# Patient Record
Sex: MALE | Race: WHITE | Employment: FULL TIME | URBAN - METROPOLITAN AREA
[De-identification: names, ages, dates, MRNs, and addresses within clinical notes are randomized per-mention and may not be internally consistent; named-entity substitution may affect disease eponyms.]

---

## 2020-05-09 ENCOUNTER — APPOINTMENT (OUTPATIENT)
Dept: CT IMAGING | Age: 25
End: 2020-05-09
Payer: MEDICAID

## 2020-05-09 ENCOUNTER — HOSPITAL ENCOUNTER (EMERGENCY)
Age: 25
Discharge: HOME | End: 2020-05-09
Attending: EMERGENCY MEDICINE
Payer: MEDICAID

## 2020-05-09 VITALS
TEMPERATURE: 98.8 F | HEART RATE: 79 BPM | SYSTOLIC BLOOD PRESSURE: 130 MMHG | DIASTOLIC BLOOD PRESSURE: 70 MMHG | RESPIRATION RATE: 16 BRPM | OXYGEN SATURATION: 99 %

## 2020-05-09 LAB
A/G RATIO: 1 (ref 1.1–2.2)
ALBUMIN SERPL-MCNC: 4 G/DL (ref 3.4–5)
ALP BLD-CCNC: 124 U/L (ref 40–129)
ALT SERPL-CCNC: 13 U/L (ref 10–40)
ANION GAP SERPL CALCULATED.3IONS-SCNC: 6 MMOL/L (ref 3–16)
AST SERPL-CCNC: 23 U/L (ref 15–37)
BASOPHILS ABSOLUTE: 0 K/UL (ref 0–0.2)
BASOPHILS RELATIVE PERCENT: 0.4 %
BILIRUB SERPL-MCNC: 0.5 MG/DL (ref 0–1)
BUN BLDV-MCNC: 11 MG/DL (ref 7–20)
C-REACTIVE PROTEIN: 147.4 MG/L (ref 0–5.1)
CALCIUM SERPL-MCNC: 9.4 MG/DL (ref 8.3–10.6)
CHLORIDE BLD-SCNC: 101 MMOL/L (ref 99–110)
CO2: 28 MMOL/L (ref 21–32)
CREAT SERPL-MCNC: 1 MG/DL (ref 0.9–1.3)
EOSINOPHILS ABSOLUTE: 0.1 K/UL (ref 0–0.6)
EOSINOPHILS RELATIVE PERCENT: 0.9 %
GFR AFRICAN AMERICAN: >60
GFR NON-AFRICAN AMERICAN: >60
GLOBULIN: 4.1 G/DL
GLUCOSE BLD-MCNC: 91 MG/DL (ref 70–99)
HCT VFR BLD CALC: 40.9 % (ref 40.5–52.5)
HEMOGLOBIN: 13.7 G/DL (ref 13.5–17.5)
LACTIC ACID, SEPSIS: 0.7 MMOL/L (ref 0.4–1.9)
LYMPHOCYTES ABSOLUTE: 1.4 K/UL (ref 1–5.1)
LYMPHOCYTES RELATIVE PERCENT: 12 %
MCH RBC QN AUTO: 29.1 PG (ref 26–34)
MCHC RBC AUTO-ENTMCNC: 33.5 G/DL (ref 31–36)
MCV RBC AUTO: 86.8 FL (ref 80–100)
MONOCYTES ABSOLUTE: 0.6 K/UL (ref 0–1.3)
MONOCYTES RELATIVE PERCENT: 5.6 %
NEUTROPHILS ABSOLUTE: 9.4 K/UL (ref 1.7–7.7)
NEUTROPHILS RELATIVE PERCENT: 81.1 %
PDW BLD-RTO: 13.3 % (ref 12.4–15.4)
PLATELET # BLD: 283 K/UL (ref 135–450)
PMV BLD AUTO: 6.6 FL (ref 5–10.5)
POTASSIUM REFLEX MAGNESIUM: 4.8 MMOL/L (ref 3.5–5.1)
RBC # BLD: 4.71 M/UL (ref 4.2–5.9)
SEDIMENTATION RATE, ERYTHROCYTE: 52 MM/HR (ref 0–15)
SODIUM BLD-SCNC: 135 MMOL/L (ref 136–145)
TOTAL PROTEIN: 8.1 G/DL (ref 6.4–8.2)
WBC # BLD: 11.6 K/UL (ref 4–11)

## 2020-05-09 PROCEDURE — 36415 COLL VENOUS BLD VENIPUNCTURE: CPT

## 2020-05-09 PROCEDURE — 80053 COMPREHEN METABOLIC PANEL: CPT

## 2020-05-09 PROCEDURE — 85652 RBC SED RATE AUTOMATED: CPT

## 2020-05-09 PROCEDURE — 6360000004 HC RX CONTRAST MEDICATION: Performed by: PHYSICIAN ASSISTANT

## 2020-05-09 PROCEDURE — 96366 THER/PROPH/DIAG IV INF ADDON: CPT

## 2020-05-09 PROCEDURE — 6360000002 HC RX W HCPCS: Performed by: PHYSICIAN ASSISTANT

## 2020-05-09 PROCEDURE — 87040 BLOOD CULTURE FOR BACTERIA: CPT

## 2020-05-09 PROCEDURE — 96375 TX/PRO/DX INJ NEW DRUG ADDON: CPT

## 2020-05-09 PROCEDURE — 99283 EMERGENCY DEPT VISIT LOW MDM: CPT

## 2020-05-09 PROCEDURE — 83605 ASSAY OF LACTIC ACID: CPT

## 2020-05-09 PROCEDURE — 96365 THER/PROPH/DIAG IV INF INIT: CPT

## 2020-05-09 PROCEDURE — 70487 CT MAXILLOFACIAL W/DYE: CPT

## 2020-05-09 PROCEDURE — 85025 COMPLETE CBC W/AUTO DIFF WBC: CPT

## 2020-05-09 PROCEDURE — 86140 C-REACTIVE PROTEIN: CPT

## 2020-05-09 PROCEDURE — 2580000003 HC RX 258: Performed by: PHYSICIAN ASSISTANT

## 2020-05-09 RX ORDER — HYDROCODONE BITARTRATE AND ACETAMINOPHEN 5; 325 MG/1; MG/1
1 TABLET ORAL EVERY 6 HOURS PRN
Qty: 8 TABLET | Refills: 0 | Status: SHIPPED | OUTPATIENT
Start: 2020-05-09 | End: 2020-05-11

## 2020-05-09 RX ORDER — AMOXICILLIN AND CLAVULANATE POTASSIUM 875; 125 MG/1; MG/1
1 TABLET, FILM COATED ORAL 2 TIMES DAILY
Qty: 20 TABLET | Refills: 0 | Status: SHIPPED | OUTPATIENT
Start: 2020-05-09 | End: 2020-05-19

## 2020-05-09 RX ORDER — KETOROLAC TROMETHAMINE 30 MG/ML
15 INJECTION, SOLUTION INTRAMUSCULAR; INTRAVENOUS ONCE
Status: COMPLETED | OUTPATIENT
Start: 2020-05-09 | End: 2020-05-09

## 2020-05-09 RX ORDER — ONDANSETRON 2 MG/ML
4 INJECTION INTRAMUSCULAR; INTRAVENOUS ONCE
Status: COMPLETED | OUTPATIENT
Start: 2020-05-09 | End: 2020-05-09

## 2020-05-09 RX ADMIN — ONDANSETRON 4 MG: 2 INJECTION INTRAMUSCULAR; INTRAVENOUS at 11:03

## 2020-05-09 RX ADMIN — IOPAMIDOL 75 ML: 755 INJECTION, SOLUTION INTRAVENOUS at 11:44

## 2020-05-09 RX ADMIN — SODIUM CHLORIDE 3 G: 900 INJECTION INTRAVENOUS at 11:07

## 2020-05-09 RX ADMIN — KETOROLAC TROMETHAMINE 15 MG: 30 INJECTION, SOLUTION INTRAMUSCULAR at 11:03

## 2020-05-09 ASSESSMENT — ENCOUNTER SYMPTOMS
CHEST TIGHTNESS: 0
NAUSEA: 0
ABDOMINAL PAIN: 0
DIARRHEA: 0
FACIAL SWELLING: 1
VOMITING: 0
SHORTNESS OF BREATH: 0

## 2020-05-09 ASSESSMENT — PAIN SCALES - GENERAL
PAINLEVEL_OUTOF10: 7
PAINLEVEL_OUTOF10: 8

## 2020-05-09 NOTE — ED PROVIDER NOTES
I independently performed a history and physical on Jalil Lewis. All diagnostic, treatment, and disposition decisions were made by myself in conjunction with the advanced practice provider. Briefly, this is a 21 y.o. male here for facial abscess  Noted 2-3d ago  No fever chills, sweats. No n/v/d. Failed PCN/Clinda    On exam, Swollen right face; some abscess noted. No communication with extroral space. Screenings                   MDM  Dental abscess; needs OMFS consult. Will consult after CT. Patient Referrals: You will receive a telephone call from the [facility] oral maxillofacial surgery to arrange an appointment time on Monday at [facility] on the second floor. Do not eat or drink after midnight on Sunday night for possible surgery. [facility] Emergency Department  [address] [place] [address]  [phone]    If symptoms worsen      Discharge Medications:  Discharge Medication List as of 5/9/2020  2:44 PM      START taking these medications    Details   amoxicillin-clavulanate (AUGMENTIN) 875-125 MG per tablet Take 1 tablet by mouth 2 times daily for 10 days, Disp-20 tablet, R-0Print      HYDROcodone-acetaminophen (NORCO) 5-325 MG per tablet Take 1 tablet by mouth every 6 hours as needed for Pain for up to 2 days. , Disp-8 tablet, R-0Print             FINAL IMPRESSION  1. 33 mm right buccal odontogenic abscess from first molar        Blood pressure 130/70, pulse 79, temperature 98.8 °F (37.1 °C), temperature source Oral, resp. rate 16, SpO2 99 %. For further details of [facility] emergency department encounter, please see documentation by advanced practice provider, Maggie Botello.        Vic Jansen MD  05/10/20 0626

## 2020-05-09 NOTE — ED NOTES
Pt discharged home, discharge instructions reviewed, pt verbalized understanding. Pt ambulatory with a steady gait, advised to return with any concerns.       Niall Barrera RN  05/09/20 2797

## 2020-05-09 NOTE — ED PROVIDER NOTES
nursing note reviewed. Constitutional:       General: He is not in acute distress. Appearance: He is well-developed. He is not diaphoretic. HENT:      Head: Normocephalic and atraumatic. No raccoon eyes or Draper's sign. Jaw: Trismus and swelling present. No tenderness. Right Ear: Hearing and external ear normal.      Left Ear: Hearing and external ear normal.      Mouth/Throat:      Lips: Pink. Mouth: Mucous membranes are moist.      Comments: Intraoral examination is very difficult as the patient cannot fully open his mouth. He is tender over the bony ridge of his dentition as well as into the right upper maxilla. Swelling is as documented in the clinical image. Eyes:      General: No scleral icterus. Right eye: No discharge. Left eye: No discharge. Conjunctiva/sclera: Conjunctivae normal.   Neck:      Musculoskeletal: Normal range of motion. Vascular: No JVD. Cardiovascular:      Rate and Rhythm: Normal rate and regular rhythm. Heart sounds: No murmur. No friction rub. No gallop. Pulmonary:      Effort: Pulmonary effort is normal. No accessory muscle usage or respiratory distress. Breath sounds: Normal breath sounds. No wheezing, rhonchi or rales. Skin:     General: Skin is warm and dry. Neurological:      Mental Status: He is alert and oriented to person, place, and time. GCS: GCS eye subscore is 4. GCS verbal subscore is 5. GCS motor subscore is 6. Cranial Nerves: No cranial nerve deficit. Sensory: No sensory deficit. Coordination: Coordination normal.   Psychiatric:         Behavior: Behavior normal. Behavior is cooperative.        Clinical image:          DIAGNOSTIC RESULTS   LABS:    Labs Reviewed   CBC WITH AUTO DIFFERENTIAL - Abnormal; Notable for the following components:       Result Value    WBC 11.6 (*)     Neutrophils Absolute 9.4 (*)     All other components within normal limits    Narrative:     Performed at:  [facility] [address]   Phone [phone]   COMPREHENSIVE METABOLIC PANEL W/ REFLEX TO MG FOR LOW K - Abnormal; Notable for the following components:    Sodium 135 (*)     Albumin/Globulin Ratio 1.0 (*)     All other components within normal limits    Narrative:     Performed at:  [facility] [address]   Phone [phone]   SEDIMENTATION RATE - Abnormal; Notable for the following components:    Sed Rate 52 (*)     All other components within normal limits    Narrative:     Performed at:  [facility] [address]   Phone [phone]   CULTURE, BLOOD 1   CULTURE, BLOOD 2   LACTATE, SEPSIS    Narrative:     Performed at:  [facility] [address]   Phone [phone]   C-REACTIVE PROTEIN       All other labs were within normal range or not returned as of this dictation. EKG: All EKG's are interpreted by the Emergency Department Physician in the absence of a cardiologist.  Please see their note for interpretation of EKG. RADIOLOGY:   Non-plain film images such as CT, Ultrasound and MRI are read by the radiologist. Plain radiographic images are visualized and preliminarily interpreted by the ED Provider with the below findings:        Interpretation per the Radiologist below, if available at the time of this note:    CT MAXILLOFACIAL W CONTRAST   Preliminary Result   33 mm right buccal space odontogenic abscess.              PROCEDURES   Unless otherwise noted below, none     Procedures    CRITICAL CARE TIME   N/A    CONSULTS:  None      EMERGENCY DEPARTMENT COURSE and DIFFERENTIAL DIAGNOSIS/MDM:   Vitals:    Vitals:    05/09/20 1130 05/09/20 1200 05/09/20 1215 05/09/20 1230   BP: 128/83 137/75  133/79   Pulse:       Resp:       Temp:       TempSrc:       SpO2:

## 2020-05-10 ENCOUNTER — HOSPITAL ENCOUNTER (EMERGENCY)
Age: 25
Discharge: HOME | End: 2020-05-10
Attending: EMERGENCY MEDICINE
Payer: MEDICAID

## 2020-05-10 VITALS
RESPIRATION RATE: 20 BRPM | SYSTOLIC BLOOD PRESSURE: 138 MMHG | HEART RATE: 100 BPM | BODY MASS INDEX: 22.5 KG/M2 | DIASTOLIC BLOOD PRESSURE: 81 MMHG | WEIGHT: 127 LBS | TEMPERATURE: 99.1 F | OXYGEN SATURATION: 98 %

## 2020-05-10 LAB — SARS-COV-2, PCR: NOT DETECTED

## 2020-05-10 PROCEDURE — 6370000000 HC RX 637 (ALT 250 FOR IP): Performed by: EMERGENCY MEDICINE

## 2020-05-10 PROCEDURE — 99283 EMERGENCY DEPT VISIT LOW MDM: CPT

## 2020-05-10 PROCEDURE — U0003 INFECTIOUS AGENT DETECTION BY NUCLEIC ACID (DNA OR RNA); SEVERE ACUTE RESPIRATORY SYNDROME CORONAVIRUS 2 (SARS-COV-2) (CORONAVIRUS DISEASE [COVID-19]), AMPLIFIED PROBE TECHNIQUE, MAKING USE OF HIGH THROUGHPUT TECHNOLOGIES AS DESCRIBED BY CMS-2020-01-R: HCPCS

## 2020-05-10 RX ORDER — HYDROCODONE BITARTRATE AND ACETAMINOPHEN 5; 325 MG/1; MG/1
2 TABLET ORAL ONCE
Status: COMPLETED | OUTPATIENT
Start: 2020-05-10 | End: 2020-05-10

## 2020-05-10 RX ORDER — AMOXICILLIN AND CLAVULANATE POTASSIUM 875; 125 MG/1; MG/1
1 TABLET, FILM COATED ORAL ONCE
Status: COMPLETED | OUTPATIENT
Start: 2020-05-10 | End: 2020-05-10

## 2020-05-10 RX ADMIN — AMOXICILLIN AND CLAVULANATE POTASSIUM 1 TABLET: 875; 125 TABLET, FILM COATED ORAL at 05:19

## 2020-05-10 RX ADMIN — HYDROCODONE BITARTRATE AND ACETAMINOPHEN 2 TABLET: 5; 325 TABLET ORAL at 05:19

## 2020-05-10 ASSESSMENT — PAIN SCALES - GENERAL
PAINLEVEL_OUTOF10: 7
PAINLEVEL_OUTOF10: 7

## 2020-05-10 NOTE — ED PROVIDER NOTES
eMERGENCY dEPARTMENT eNCOUnter      CHIEF COMPLAINT    No chief complaint on file. HPI    Grace Sloan is a 21 y.o. male who presents wanting a test for COVID-19. Patient has a painful abscess for which he is on antibiotics and he is known to have the surgery but he was called and told to return to the emergency department to get a COVID-19 test.  He returned at 5 AM.  He has some facial swelling no difficulty breathing or swallowing he is afebrile. No shortness of breath or any other complaint    PAST MEDICAL HISTORY    Past Medical History:   Diagnosis Date    Asthma     HIV (human immunodeficiency virus infection) ([facility] [address]        SURGICAL HISTORY    No past surgical history on file. CURRENT MEDICATIONS    Current Outpatient Rx   Medication Sig Dispense Refill    amoxicillin-clavulanate (AUGMENTIN) 875-125 MG per tablet Take 1 tablet by mouth 2 times daily for 10 days 20 tablet 0    HYDROcodone-acetaminophen (NORCO) 5-325 MG per tablet Take 1 tablet by mouth every 6 hours as needed for Pain for up to 2 days. 8 tablet 0    STRIBILD [phone] MG tablet TAKE 1 TABLET BY MOUTH DAILY. 6       ALLERGIES    Allergies   Allergen Reactions    Singulair [Montelukast Sodium] Other (See Comments)     headache       FAMILY HISTORY    No family history on file.     SOCIAL HISTORY    Social History     Socioeconomic History    Marital status: Single     Spouse name: Not on file    Number of children: Not on file    Years of education: Not on file    Highest education level: Not on file   Occupational History    Not on file   Social Needs    Financial resource strain: Not on file    Food insecurity     Worry: Not on file     Inability: Not on file    Transportation needs     Medical: Not on file     Non-medical: Not on file   Tobacco Use    Smoking status: Current Every Day Smoker     Packs/day: 0.50     Types: Cigarettes   Substance and Sexual Activity    Alcohol use: Yes     Comment: once weekly    Drug use: No    Sexual activity: Not on file   Lifestyle    Physical activity     Days per week: Not on file     Minutes per session: Not on file    Stress: Not on file   Relationships    Social connections     Talks on phone: Not on file     Gets together: Not on file     Attends Jehovah's witness service: Not on file     Active member of club or organization: Not on file     Attends meetings of clubs or organizations: Not on file     Relationship status: Not on file    Intimate partner violence     Fear of current or ex partner: Not on file     Emotionally abused: Not on file     Physically abused: Not on file     Forced sexual activity: Not on file   Other Topics Concern    Not on file   Social History Narrative    Not on file       REVIEW OF SYSTEMS    Constitutional:  Denies fever, chills, weight loss or weakness   Eyes:  Denies photophobia or discharge   HENT:  Denies sore throat or ear pain, facial swelling on the right  Respiratory:  Denies cough or shortness of breath   Cardiovascular:  Denies chest pain, palpitations or swelling   GI:  Denies abdominal pain, nausea, vomiting, or diarrhea   Musculoskeletal:  Denies back pain   Skin:  Denies rash   Neurologic:  Denies headache, focal weakness or sensory changes   Endocrine:  Denies polyuria or polydypsia   Lymphatic:  Denies swollen glands   Psychiatric:  Denies depression, suicidal ideation or homicidal ideation   All systems negative except as marked. PHYSICAL EXAM    VITAL SIGNS: There were no vitals taken for this visit. Constitutional:  Well developed, Well nourished, No acute distress, Non-toxic appearance. HENT: Right-sided facial swelling atraumatic, Bilateral external ears normal, Oropharynx moist, No oral exudates, Nose normal. Neck- Normal range of motion, No tenderness, Supple, No stridor. Eyes:  PERRL, EOMI, Conjunctiva normal, No discharge.    Respiratory:  Normal breath sounds, No respiratory distress, No wheezing, No chest tenderness. Cardiovascular:  Normal heart rate, Normal rhythm, No murmurs, No rubs, No gallops. GI:  Bowel sounds normal, Soft, No tenderness, No masses, No pulsatile masses. Musculoskeletal:  Intact distal pulses, No edema, No tenderness, No cyanosis, No clubbing. Good range of motion in all major joints. No tenderness to palpation or major deformities noted. Back- No tenderness. Integument:  Warm, Dry, No erythema, No rash. Lymphatic:  No lymphadenopathy noted. Neurologic:  Alert & oriented x 3, Normal motor function, Normal sensory function, No focal deficits noted. Psychiatric:  Affect normal, Judgment normal, Mood normal.     EKG        RADIOLOGY        PROCEDURES        ED COURSE & MEDICAL DECISION MAKING    Pertinent Labs & Imaging studies reviewed. (See chart for details)  We did a COVID-19 swab for his preoperative evaluation. He is asymptomatic and complains of swelling. He did not voice any other concerns    FINAL IMPRESSION    1.  Facial abscess             Justine Knott MD  05/10/20 1843

## 2020-05-11 ENCOUNTER — CARE COORDINATION (OUTPATIENT)
Dept: CARE COORDINATION | Age: 25
End: 2020-05-11

## 2020-05-11 NOTE — CARE COORDINATION
Patient contacted regarding Saray Childers. Care Transition Nurse/ Ambulatory Care Manager contacted the patient by telephone to perform post discharge assessment. Verified name and  with patient as identifiers. Provided introduction to self, and explanation of the CTN/ACM role, and reason for call due to risk factors for infection and/or exposure to COVID-19. Symptoms reviewed with patient who verbalized the following symptoms: no new symptoms and no worsening symptoms. Due to no new or worsening symptoms encounter was not routed to provider for escalation. Patient has following risk factors of: asthma and immunocompromised. CTN/ACM reviewed discharge instructions, medical action plan and red flags such as increased shortness of breath, increasing fever and signs of decompensation with patient who verbalized understanding. Discussed exposure protocols and quarantine with CDC Guidelines What to do if you are sick with coronavirus disease .  Patient was given an opportunity for questions and concerns. The patient agrees to contact the [facility] exposure line [phone]  and PCP office for questions related to their healthcare. CTN/ACM provided contact information for future needs. Reviewed and educated patient on any new and changed medications related to discharge diagnosis     Patient/family/caregiver given information for GetWell Loop and agrees to enroll yes  Patient's preferred e-mail: [email or web address]   Patient's preferred phone number: [phone]   Based on Loop alert triggers, patient will be contacted by nurse care manager for worsening symptoms. Pt will be further monitored by COVID Loop Team based on severity of symptoms and risk factors. Princess Jacobs plans to contact  today to schedule an appt.  has not contacted today yet. He is taking his antibiotic and his pain is controlled. Mima Reynaldo was made aware that his COVID test was negative.   NO other symptoms at this

## 2020-05-13 LAB
BLOOD CULTURE, ROUTINE: NORMAL
CULTURE, BLOOD 2: NORMAL

## 2020-09-27 ENCOUNTER — HOSPITAL ENCOUNTER (INPATIENT)
Age: 25
LOS: 4 days | Discharge: HOME | DRG: 254 | End: 2020-10-01
Attending: EMERGENCY MEDICINE | Admitting: HOSPITALIST
Payer: MEDICAID

## 2020-09-27 ENCOUNTER — APPOINTMENT (OUTPATIENT)
Dept: CT IMAGING | Age: 25
DRG: 254 | End: 2020-09-27
Payer: MEDICAID

## 2020-09-27 PROBLEM — K92.2 GI BLEED: Status: ACTIVE | Noted: 2020-09-27

## 2020-09-27 LAB
A/G RATIO: 1.2 (ref 1.1–2.2)
ALBUMIN SERPL-MCNC: 3.7 G/DL (ref 3.4–5)
ALP BLD-CCNC: 74 U/L (ref 40–129)
ALT SERPL-CCNC: 14 U/L (ref 10–40)
ANION GAP SERPL CALCULATED.3IONS-SCNC: 7 MMOL/L (ref 3–16)
AST SERPL-CCNC: 19 U/L (ref 15–37)
BASOPHILS ABSOLUTE: 0.1 K/UL (ref 0–0.2)
BASOPHILS RELATIVE PERCENT: 1.1 %
BILIRUB SERPL-MCNC: 0.4 MG/DL (ref 0–1)
BUN BLDV-MCNC: 13 MG/DL (ref 7–20)
CALCIUM SERPL-MCNC: 8.6 MG/DL (ref 8.3–10.6)
CHLORIDE BLD-SCNC: 103 MMOL/L (ref 99–110)
CO2: 25 MMOL/L (ref 21–32)
CREAT SERPL-MCNC: 1.1 MG/DL (ref 0.9–1.3)
EOSINOPHILS ABSOLUTE: 0.1 K/UL (ref 0–0.6)
EOSINOPHILS RELATIVE PERCENT: 1.3 %
GFR AFRICAN AMERICAN: >60
GFR NON-AFRICAN AMERICAN: >60
GLOBULIN: 3 G/DL
GLUCOSE BLD-MCNC: 104 MG/DL (ref 70–99)
HCT VFR BLD CALC: 23.6 % (ref 40.5–52.5)
HEMOGLOBIN: 8.1 G/DL (ref 13.5–17.5)
LACTIC ACID, SEPSIS: 0.5 MMOL/L (ref 0.4–1.9)
LIPASE: 14 U/L (ref 13–60)
LYMPHOCYTES ABSOLUTE: 1.8 K/UL (ref 1–5.1)
LYMPHOCYTES RELATIVE PERCENT: 21.9 %
MCH RBC QN AUTO: 29.5 PG (ref 26–34)
MCHC RBC AUTO-ENTMCNC: 34.5 G/DL (ref 31–36)
MCV RBC AUTO: 85.7 FL (ref 80–100)
MONOCYTES ABSOLUTE: 0.5 K/UL (ref 0–1.3)
MONOCYTES RELATIVE PERCENT: 6 %
NEUTROPHILS ABSOLUTE: 5.6 K/UL (ref 1.7–7.7)
NEUTROPHILS RELATIVE PERCENT: 69.7 %
PDW BLD-RTO: 13.8 % (ref 12.4–15.4)
PLATELET # BLD: 320 K/UL (ref 135–450)
PMV BLD AUTO: 6.4 FL (ref 5–10.5)
POTASSIUM SERPL-SCNC: 3.9 MMOL/L (ref 3.5–5.1)
RBC # BLD: 2.76 M/UL (ref 4.2–5.9)
SODIUM BLD-SCNC: 135 MMOL/L (ref 136–145)
TOTAL PROTEIN: 6.7 G/DL (ref 6.4–8.2)
WBC # BLD: 8.1 K/UL (ref 4–11)

## 2020-09-27 PROCEDURE — 6360000002 HC RX W HCPCS: Performed by: NURSE PRACTITIONER

## 2020-09-27 PROCEDURE — 96375 TX/PRO/DX INJ NEW DRUG ADDON: CPT

## 2020-09-27 PROCEDURE — 74177 CT ABD & PELVIS W/CONTRAST: CPT

## 2020-09-27 PROCEDURE — 85025 COMPLETE CBC W/AUTO DIFF WBC: CPT

## 2020-09-27 PROCEDURE — 83690 ASSAY OF LIPASE: CPT

## 2020-09-27 PROCEDURE — 2580000003 HC RX 258: Performed by: NURSE PRACTITIONER

## 2020-09-27 PROCEDURE — 83605 ASSAY OF LACTIC ACID: CPT

## 2020-09-27 PROCEDURE — 99284 EMERGENCY DEPT VISIT MOD MDM: CPT

## 2020-09-27 PROCEDURE — 80053 COMPREHEN METABOLIC PANEL: CPT

## 2020-09-27 PROCEDURE — 2580000003 HC RX 258: Performed by: HOSPITALIST

## 2020-09-27 PROCEDURE — 96374 THER/PROPH/DIAG INJ IV PUSH: CPT

## 2020-09-27 PROCEDURE — 2060000000 HC ICU INTERMEDIATE R&B

## 2020-09-27 PROCEDURE — 6360000004 HC RX CONTRAST MEDICATION: Performed by: NURSE PRACTITIONER

## 2020-09-27 RX ORDER — MORPHINE SULFATE 4 MG/ML
4 INJECTION, SOLUTION INTRAMUSCULAR; INTRAVENOUS ONCE
Status: COMPLETED | OUTPATIENT
Start: 2020-09-27 | End: 2020-09-27

## 2020-09-27 RX ORDER — 0.9 % SODIUM CHLORIDE 0.9 %
1000 INTRAVENOUS SOLUTION INTRAVENOUS ONCE
Status: COMPLETED | OUTPATIENT
Start: 2020-09-27 | End: 2020-09-28

## 2020-09-27 RX ORDER — PEG-3350, SODIUM SULFATE, SODIUM CHLORIDE, POTASSIUM CHLORIDE, SODIUM ASCORBATE AND ASCORBIC ACID 7.5-2.691G
100 KIT ORAL ONCE
Status: COMPLETED | OUTPATIENT
Start: 2020-09-27 | End: 2020-09-28

## 2020-09-27 RX ORDER — 0.9 % SODIUM CHLORIDE 0.9 %
1000 INTRAVENOUS SOLUTION INTRAVENOUS ONCE
Status: COMPLETED | OUTPATIENT
Start: 2020-09-27 | End: 2020-09-27

## 2020-09-27 RX ORDER — CITALOPRAM 20 MG/1
20 TABLET ORAL DAILY
Status: ON HOLD | COMMUNITY
End: 2020-12-31

## 2020-09-27 RX ORDER — ONDANSETRON 2 MG/ML
4 INJECTION INTRAMUSCULAR; INTRAVENOUS EVERY 30 MIN PRN
Status: DISCONTINUED | OUTPATIENT
Start: 2020-09-27 | End: 2020-09-28 | Stop reason: SDUPTHER

## 2020-09-27 RX ADMIN — IOPAMIDOL 75 ML: 755 INJECTION, SOLUTION INTRAVENOUS at 21:27

## 2020-09-27 RX ADMIN — MORPHINE SULFATE 4 MG: 4 INJECTION, SOLUTION INTRAMUSCULAR; INTRAVENOUS at 20:32

## 2020-09-27 RX ADMIN — SODIUM CHLORIDE 1000 ML: 9 INJECTION, SOLUTION INTRAVENOUS at 20:30

## 2020-09-27 RX ADMIN — SODIUM CHLORIDE 1000 ML: 9 INJECTION, SOLUTION INTRAVENOUS at 23:55

## 2020-09-27 RX ADMIN — ONDANSETRON 4 MG: 2 INJECTION INTRAMUSCULAR; INTRAVENOUS at 20:30

## 2020-09-27 ASSESSMENT — ENCOUNTER SYMPTOMS
VOMITING: 0
NAUSEA: 0
DIARRHEA: 0
SHORTNESS OF BREATH: 0
ABDOMINAL PAIN: 1
ANAL BLEEDING: 1
CHEST TIGHTNESS: 0

## 2020-09-27 ASSESSMENT — PAIN SCALES - GENERAL
PAINLEVEL_OUTOF10: 10
PAINLEVEL_OUTOF10: 10

## 2020-09-27 NOTE — ED NOTES
Bed: 21  Expected date:   Expected time:   Means of arrival: Walk In  Comments:     [facility]  09/27/20 1910

## 2020-09-28 ENCOUNTER — ANESTHESIA EVENT (OUTPATIENT)
Dept: ENDOSCOPY | Age: 25
DRG: 254 | End: 2020-09-28
Payer: MEDICAID

## 2020-09-28 ENCOUNTER — ANESTHESIA (OUTPATIENT)
Dept: ENDOSCOPY | Age: 25
DRG: 254 | End: 2020-09-28
Payer: MEDICAID

## 2020-09-28 VITALS
DIASTOLIC BLOOD PRESSURE: 73 MMHG | RESPIRATION RATE: 12 BRPM | OXYGEN SATURATION: 100 % | SYSTOLIC BLOOD PRESSURE: 118 MMHG

## 2020-09-28 LAB
A/G RATIO: 1.5 (ref 1.1–2.2)
ABO/RH: NORMAL
ALBUMIN SERPL-MCNC: 3.4 G/DL (ref 3.4–5)
ALP BLD-CCNC: 71 U/L (ref 40–129)
ALT SERPL-CCNC: 13 U/L (ref 10–40)
ANION GAP SERPL CALCULATED.3IONS-SCNC: 6 MMOL/L (ref 3–16)
ANTIBODY SCREEN: NORMAL
AST SERPL-CCNC: 20 U/L (ref 15–37)
BASOPHILS ABSOLUTE: 0 K/UL (ref 0–0.2)
BASOPHILS RELATIVE PERCENT: 0.5 %
BILIRUB SERPL-MCNC: 0.6 MG/DL (ref 0–1)
BLOOD BANK DISPENSE STATUS: NORMAL
BLOOD BANK DISPENSE STATUS: NORMAL
BLOOD BANK PRODUCT CODE: NORMAL
BLOOD BANK PRODUCT CODE: NORMAL
BPU ID: NORMAL
BPU ID: NORMAL
BUN BLDV-MCNC: 13 MG/DL (ref 7–20)
CALCIUM SERPL-MCNC: 7.8 MG/DL (ref 8.3–10.6)
CHLORIDE BLD-SCNC: 109 MMOL/L (ref 99–110)
CO2: 26 MMOL/L (ref 21–32)
CREAT SERPL-MCNC: 1 MG/DL (ref 0.9–1.3)
DESCRIPTION BLOOD BANK: NORMAL
DESCRIPTION BLOOD BANK: NORMAL
EOSINOPHILS ABSOLUTE: 0.1 K/UL (ref 0–0.6)
EOSINOPHILS RELATIVE PERCENT: 1.8 %
GFR AFRICAN AMERICAN: >60
GFR NON-AFRICAN AMERICAN: >60
GLOBULIN: 2.3 G/DL
GLUCOSE BLD-MCNC: 81 MG/DL (ref 70–99)
HCT VFR BLD CALC: 18.9 % (ref 40.5–52.5)
HCT VFR BLD CALC: 26.7 % (ref 40.5–52.5)
HCT VFR BLD CALC: 27.6 % (ref 40.5–52.5)
HCT VFR BLD CALC: 28.9 % (ref 40.5–52.5)
HEMOGLOBIN: 6.8 G/DL (ref 13.5–17.5)
HEMOGLOBIN: 9 G/DL (ref 13.5–17.5)
HEMOGLOBIN: 9.5 G/DL (ref 13.5–17.5)
HEMOGLOBIN: 9.9 G/DL (ref 13.5–17.5)
LYMPHOCYTES ABSOLUTE: 1.5 K/UL (ref 1–5.1)
LYMPHOCYTES RELATIVE PERCENT: 24.2 %
MCH RBC QN AUTO: 28.9 PG (ref 26–34)
MCHC RBC AUTO-ENTMCNC: 33.9 G/DL (ref 31–36)
MCV RBC AUTO: 85.5 FL (ref 80–100)
MONOCYTES ABSOLUTE: 0.4 K/UL (ref 0–1.3)
MONOCYTES RELATIVE PERCENT: 6.4 %
NEUTROPHILS ABSOLUTE: 4.1 K/UL (ref 1.7–7.7)
NEUTROPHILS RELATIVE PERCENT: 67.1 %
PDW BLD-RTO: 14.9 % (ref 12.4–15.4)
PLATELET # BLD: 226 K/UL (ref 135–450)
PMV BLD AUTO: 6.2 FL (ref 5–10.5)
POTASSIUM REFLEX MAGNESIUM: 4 MMOL/L (ref 3.5–5.1)
RBC # BLD: 3.13 M/UL (ref 4.2–5.9)
SODIUM BLD-SCNC: 141 MMOL/L (ref 136–145)
TOTAL PROTEIN: 5.7 G/DL (ref 6.4–8.2)
WBC # BLD: 6.1 K/UL (ref 4–11)
WHITE BLOOD CELLS (WBC), STOOL: ABNORMAL

## 2020-09-28 PROCEDURE — 2580000003 HC RX 258: Performed by: NURSE ANESTHETIST, CERTIFIED REGISTERED

## 2020-09-28 PROCEDURE — 80053 COMPREHEN METABOLIC PANEL: CPT

## 2020-09-28 PROCEDURE — 86900 BLOOD TYPING SEROLOGIC ABO: CPT

## 2020-09-28 PROCEDURE — 86901 BLOOD TYPING SEROLOGIC RH(D): CPT

## 2020-09-28 PROCEDURE — 6360000002 HC RX W HCPCS: Performed by: NURSE ANESTHETIST, CERTIFIED REGISTERED

## 2020-09-28 PROCEDURE — 6370000000 HC RX 637 (ALT 250 FOR IP): Performed by: NURSE PRACTITIONER

## 2020-09-28 PROCEDURE — 2709999900 HC NON-CHARGEABLE SUPPLY: Performed by: INTERNAL MEDICINE

## 2020-09-28 PROCEDURE — 2500000003 HC RX 250 WO HCPCS: Performed by: NURSE ANESTHETIST, CERTIFIED REGISTERED

## 2020-09-28 PROCEDURE — 88342 IMHCHEM/IMCYTCHM 1ST ANTB: CPT

## 2020-09-28 PROCEDURE — 2580000003 HC RX 258: Performed by: ANESTHESIOLOGY

## 2020-09-28 PROCEDURE — 87506 IADNA-DNA/RNA PROBE TQ 6-11: CPT

## 2020-09-28 PROCEDURE — 3700000000 HC ANESTHESIA ATTENDED CARE: Performed by: INTERNAL MEDICINE

## 2020-09-28 PROCEDURE — 87177 OVA AND PARASITES SMEARS: CPT

## 2020-09-28 PROCEDURE — 2060000000 HC ICU INTERMEDIATE R&B

## 2020-09-28 PROCEDURE — 88305 TISSUE EXAM BY PATHOLOGIST: CPT

## 2020-09-28 PROCEDURE — 0DBP8ZX EXCISION OF RECTUM, VIA NATURAL OR ARTIFICIAL OPENING ENDOSCOPIC, DIAGNOSTIC: ICD-10-PCS | Performed by: INTERNAL MEDICINE

## 2020-09-28 PROCEDURE — 3609008300 HC SIGMOIDOSCOPY W/BIOPSY SINGLE/MULTIPLE: Performed by: INTERNAL MEDICINE

## 2020-09-28 PROCEDURE — 7100000000 HC PACU RECOVERY - FIRST 15 MIN: Performed by: INTERNAL MEDICINE

## 2020-09-28 PROCEDURE — 88341 IMHCHEM/IMCYTCHM EA ADD ANTB: CPT

## 2020-09-28 PROCEDURE — 85025 COMPLETE CBC W/AUTO DIFF WBC: CPT

## 2020-09-28 PROCEDURE — 0DBQ8ZX EXCISION OF ANUS, VIA NATURAL OR ARTIFICIAL OPENING ENDOSCOPIC, DIAGNOSTIC: ICD-10-PCS | Performed by: INTERNAL MEDICINE

## 2020-09-28 PROCEDURE — 85018 HEMOGLOBIN: CPT

## 2020-09-28 PROCEDURE — 86850 RBC ANTIBODY SCREEN: CPT

## 2020-09-28 PROCEDURE — 94761 N-INVAS EAR/PLS OXIMETRY MLT: CPT

## 2020-09-28 PROCEDURE — 7100000001 HC PACU RECOVERY - ADDTL 15 MIN: Performed by: INTERNAL MEDICINE

## 2020-09-28 PROCEDURE — 85014 HEMATOCRIT: CPT

## 2020-09-28 PROCEDURE — 87425 ROTAVIRUS AG IA: CPT

## 2020-09-28 PROCEDURE — P9016 RBC LEUKOCYTES REDUCED: HCPCS

## 2020-09-28 PROCEDURE — 2580000003 HC RX 258: Performed by: HOSPITALIST

## 2020-09-28 PROCEDURE — 83630 LACTOFERRIN FECAL (QUAL): CPT

## 2020-09-28 PROCEDURE — 36415 COLL VENOUS BLD VENIPUNCTURE: CPT

## 2020-09-28 PROCEDURE — 6360000002 HC RX W HCPCS: Performed by: HOSPITALIST

## 2020-09-28 PROCEDURE — 87209 SMEAR COMPLEX STAIN: CPT

## 2020-09-28 PROCEDURE — 86923 COMPATIBILITY TEST ELECTRIC: CPT

## 2020-09-28 PROCEDURE — 36430 TRANSFUSION BLD/BLD COMPNT: CPT

## 2020-09-28 PROCEDURE — 3700000001 HC ADD 15 MINUTES (ANESTHESIA): Performed by: INTERNAL MEDICINE

## 2020-09-28 RX ORDER — SODIUM CHLORIDE 9 MG/ML
INJECTION, SOLUTION INTRAVENOUS CONTINUOUS
Status: DISCONTINUED | OUTPATIENT
Start: 2020-09-28 | End: 2020-10-01

## 2020-09-28 RX ORDER — SODIUM CHLORIDE 0.9 % (FLUSH) 0.9 %
10 SYRINGE (ML) INJECTION EVERY 12 HOURS SCHEDULED
Status: DISCONTINUED | OUTPATIENT
Start: 2020-09-28 | End: 2020-10-01 | Stop reason: HOSPADM

## 2020-09-28 RX ORDER — LIDOCAINE HYDROCHLORIDE 20 MG/ML
INJECTION, SOLUTION INFILTRATION; PERINEURAL PRN
Status: DISCONTINUED | OUTPATIENT
Start: 2020-09-28 | End: 2020-09-28 | Stop reason: SDUPTHER

## 2020-09-28 RX ORDER — SODIUM CHLORIDE 0.9 % (FLUSH) 0.9 %
10 SYRINGE (ML) INJECTION PRN
Status: DISCONTINUED | OUTPATIENT
Start: 2020-09-28 | End: 2020-10-01 | Stop reason: HOSPADM

## 2020-09-28 RX ORDER — 0.9 % SODIUM CHLORIDE 0.9 %
20 INTRAVENOUS SOLUTION INTRAVENOUS ONCE
Status: COMPLETED | OUTPATIENT
Start: 2020-09-28 | End: 2020-09-28

## 2020-09-28 RX ORDER — POTASSIUM CHLORIDE 7.45 MG/ML
10 INJECTION INTRAVENOUS PRN
Status: DISCONTINUED | OUTPATIENT
Start: 2020-09-28 | End: 2020-10-01 | Stop reason: HOSPADM

## 2020-09-28 RX ORDER — SODIUM CHLORIDE 9 MG/ML
INJECTION, SOLUTION INTRAVENOUS CONTINUOUS
Status: DISCONTINUED | OUTPATIENT
Start: 2020-09-28 | End: 2020-09-29

## 2020-09-28 RX ORDER — SODIUM CHLORIDE 9 MG/ML
INJECTION, SOLUTION INTRAVENOUS CONTINUOUS PRN
Status: DISCONTINUED | OUTPATIENT
Start: 2020-09-28 | End: 2020-09-28 | Stop reason: SDUPTHER

## 2020-09-28 RX ORDER — ONDANSETRON 2 MG/ML
4 INJECTION INTRAMUSCULAR; INTRAVENOUS EVERY 6 HOURS PRN
Status: DISCONTINUED | OUTPATIENT
Start: 2020-09-28 | End: 2020-10-01 | Stop reason: HOSPADM

## 2020-09-28 RX ORDER — ACETAMINOPHEN 325 MG/1
650 TABLET ORAL EVERY 6 HOURS PRN
Status: DISCONTINUED | OUTPATIENT
Start: 2020-09-28 | End: 2020-10-01 | Stop reason: HOSPADM

## 2020-09-28 RX ORDER — 0.9 % SODIUM CHLORIDE 0.9 %
1000 INTRAVENOUS SOLUTION INTRAVENOUS ONCE
Status: COMPLETED | OUTPATIENT
Start: 2020-09-28 | End: 2020-09-28

## 2020-09-28 RX ORDER — POLYETHYLENE GLYCOL 3350 17 G/17G
17 POWDER, FOR SOLUTION ORAL DAILY PRN
Status: DISCONTINUED | OUTPATIENT
Start: 2020-09-28 | End: 2020-10-01 | Stop reason: HOSPADM

## 2020-09-28 RX ORDER — MORPHINE SULFATE 2 MG/ML
2 INJECTION, SOLUTION INTRAMUSCULAR; INTRAVENOUS EVERY 4 HOURS PRN
Status: DISCONTINUED | OUTPATIENT
Start: 2020-09-28 | End: 2020-10-01 | Stop reason: HOSPADM

## 2020-09-28 RX ORDER — PROPOFOL 10 MG/ML
INJECTION, EMULSION INTRAVENOUS CONTINUOUS PRN
Status: DISCONTINUED | OUTPATIENT
Start: 2020-09-28 | End: 2020-09-28 | Stop reason: SDUPTHER

## 2020-09-28 RX ORDER — PROPOFOL 10 MG/ML
INJECTION, EMULSION INTRAVENOUS PRN
Status: DISCONTINUED | OUTPATIENT
Start: 2020-09-28 | End: 2020-09-28 | Stop reason: SDUPTHER

## 2020-09-28 RX ORDER — ACETAMINOPHEN 650 MG/1
650 SUPPOSITORY RECTAL EVERY 6 HOURS PRN
Status: DISCONTINUED | OUTPATIENT
Start: 2020-09-28 | End: 2020-10-01 | Stop reason: HOSPADM

## 2020-09-28 RX ORDER — PROMETHAZINE HYDROCHLORIDE 25 MG/1
12.5 TABLET ORAL EVERY 6 HOURS PRN
Status: DISCONTINUED | OUTPATIENT
Start: 2020-09-28 | End: 2020-10-01 | Stop reason: HOSPADM

## 2020-09-28 RX ORDER — POTASSIUM CHLORIDE 20 MEQ/1
40 TABLET, EXTENDED RELEASE ORAL PRN
Status: DISCONTINUED | OUTPATIENT
Start: 2020-09-28 | End: 2020-10-01 | Stop reason: HOSPADM

## 2020-09-28 RX ORDER — MAGNESIUM SULFATE IN WATER 40 MG/ML
2 INJECTION, SOLUTION INTRAVENOUS PRN
Status: DISCONTINUED | OUTPATIENT
Start: 2020-09-28 | End: 2020-10-01 | Stop reason: HOSPADM

## 2020-09-28 RX ADMIN — PROPOFOL 80 MG: 10 INJECTION, EMULSION INTRAVENOUS at 13:20

## 2020-09-28 RX ADMIN — MORPHINE SULFATE 2 MG: 2 INJECTION, SOLUTION INTRAMUSCULAR; INTRAVENOUS at 08:31

## 2020-09-28 RX ADMIN — LIDOCAINE HYDROCHLORIDE 80 MG: 20 INJECTION, SOLUTION INFILTRATION; PERINEURAL at 13:20

## 2020-09-28 RX ADMIN — SODIUM CHLORIDE: 9 INJECTION, SOLUTION INTRAVENOUS at 13:15

## 2020-09-28 RX ADMIN — POLYETHYLENE GLYCOL 3350, SODIUM SULFATE, SODIUM CHLORIDE, POTASSIUM CHLORIDE, ASCORBIC ACID, SODIUM ASCORBATE 100 G: KIT at 01:44

## 2020-09-28 RX ADMIN — POLYETHYLENE GLYCOL 3350, SODIUM SULFATE, SODIUM CHLORIDE, POTASSIUM CHLORIDE, ASCORBIC ACID, SODIUM ASCORBATE 100 G: KIT at 06:36

## 2020-09-28 RX ADMIN — PROPOFOL 140 MCG/KG/MIN: 10 INJECTION, EMULSION INTRAVENOUS at 13:20

## 2020-09-28 RX ADMIN — PROPOFOL 60 MG: 10 INJECTION, EMULSION INTRAVENOUS at 13:24

## 2020-09-28 RX ADMIN — SODIUM CHLORIDE 20 ML: 9 INJECTION, SOLUTION INTRAVENOUS at 06:33

## 2020-09-28 RX ADMIN — SODIUM CHLORIDE: 9 INJECTION, SOLUTION INTRAVENOUS at 01:53

## 2020-09-28 RX ADMIN — SODIUM CHLORIDE: 9 INJECTION, SOLUTION INTRAVENOUS at 21:46

## 2020-09-28 RX ADMIN — SODIUM CHLORIDE 1000 ML: 9 INJECTION, SOLUTION INTRAVENOUS at 08:23

## 2020-09-28 RX ADMIN — SODIUM CHLORIDE: 9 INJECTION, SOLUTION INTRAVENOUS at 12:21

## 2020-09-28 ASSESSMENT — PAIN SCALES - GENERAL
PAINLEVEL_OUTOF10: 0
PAINLEVEL_OUTOF10: 9
PAINLEVEL_OUTOF10: 0
PAINLEVEL_OUTOF10: 3
PAINLEVEL_OUTOF10: 9
PAINLEVEL_OUTOF10: 0

## 2020-09-28 ASSESSMENT — PAIN DESCRIPTION - PROGRESSION
CLINICAL_PROGRESSION: NOT CHANGED

## 2020-09-28 ASSESSMENT — PAIN - FUNCTIONAL ASSESSMENT
PAIN_FUNCTIONAL_ASSESSMENT: 0-10
PAIN_FUNCTIONAL_ASSESSMENT: PREVENTS OR INTERFERES SOME ACTIVE ACTIVITIES AND ADLS

## 2020-09-28 ASSESSMENT — PAIN DESCRIPTION - DESCRIPTORS: DESCRIPTORS: ACHING;BURNING;CONSTANT

## 2020-09-28 ASSESSMENT — PAIN DESCRIPTION - PAIN TYPE: TYPE: ACUTE PAIN

## 2020-09-28 ASSESSMENT — PAIN DESCRIPTION - FREQUENCY: FREQUENCY: CONTINUOUS

## 2020-09-28 ASSESSMENT — PAIN DESCRIPTION - ONSET: ONSET: ON-GOING

## 2020-09-28 ASSESSMENT — PAIN DESCRIPTION - LOCATION: LOCATION: ABDOMEN

## 2020-09-28 ASSESSMENT — PAIN DESCRIPTION - ORIENTATION: ORIENTATION: LOWER

## 2020-09-28 NOTE — PROGRESS NOTES
Dr. Anny English talked to and pt will be starting blood transfusion within the next half hour. Second  peripheral line will be started with blood administration for bolus of NS. Pt alert and oriented just lethargic and states he \"feels weak\". BP and HR also communicated with . Patient will be monitored closely for the next hour.

## 2020-09-28 NOTE — ED PROVIDER NOTES
OF SYSTEMS    (2-9 systems for level 4, 10 or more for level 5)     Review of Systems   Constitutional: Negative for activity change, chills and fever. Respiratory: Negative for chest tightness and shortness of breath. Cardiovascular: Negative for chest pain. Gastrointestinal: Positive for abdominal pain and anal bleeding. Negative for diarrhea, nausea and vomiting. Genitourinary: Negative for dysuria. All other systems reviewed and are negative. Positives and Pertinent negatives as per HPI. Except as noted above in the ROS, all other systems were reviewed and negative. PAST MEDICAL HISTORY     Past Medical History:   Diagnosis Date    Asthma     HIV (human immunodeficiency virus infection) ([facility] [address]          SURGICAL HISTORY   History reviewed. No pertinent surgical history. CURRENTMEDICATIONS       Previous Medications    BICTEGRAVIR-EMTRICITAB-TENOFOV (BIKTARVY PO)    Take by mouth    CITALOPRAM (CELEXA) 20 MG TABLET    Take 20 mg by mouth daily         ALLERGIES     Singulair [montelukast sodium]    FAMILYHISTORY     History reviewed. No pertinent family history. SOCIAL HISTORY       Social History     Tobacco Use    Smoking status: Current Every Day Smoker     Packs/day: 0.50     Types: Cigarettes   Substance Use Topics    Alcohol use: Yes     Comment: once weekly    Drug use: No       SCREENINGS             PHYSICAL EXAM    (up to 7 for level 4, 8 or more for level 5)     ED Triage Vitals [09/27/20 1900]   BP Temp Temp Source Pulse Resp SpO2 Height Weight   121/70 97.5 °F (36.4 °C) Temporal 117 14 100 % 5' 3\" (1.6 m) 127 lb (57.6 kg)       Physical Exam  Vitals signs and nursing note reviewed. Constitutional:       Appearance: He is well-developed. He is not diaphoretic. HENT:      Head: Normocephalic and atraumatic. Right Ear: External ear normal.      Left Ear: External ear normal.   Eyes:      General:         Right eye: No discharge.          Left eye: No discharge. Neck:      Musculoskeletal: Normal range of motion and neck supple. Vascular: No JVD. Cardiovascular:      Rate and Rhythm: Normal rate and regular rhythm. Pulses: Normal pulses. Heart sounds: Normal heart sounds. Pulmonary:      Effort: Pulmonary effort is normal. No respiratory distress. Breath sounds: Normal breath sounds. Abdominal:      Palpations: Abdomen is soft. Tenderness: There is abdominal tenderness. Comments: Rectum is wnl- no bleeding or anal tear noted. No external hemorrhoids. Musculoskeletal: Normal range of motion. Skin:     General: Skin is warm and dry. Coloration: Skin is not pale. Neurological:      Mental Status: He is alert and oriented to person, place, and time. Psychiatric:         Behavior: Behavior normal.         DIAGNOSTIC RESULTS   LABS:    Labs Reviewed   CBC WITH AUTO DIFFERENTIAL - Abnormal; Notable for the following components:       Result Value    RBC 2.76 (*)     Hemoglobin 8.1 (*)     Hematocrit 23.6 (*)     All other components within normal limits    Narrative:     Performed at:  [facility] [address], [place], [place]   Phone [phone]   COMPREHENSIVE METABOLIC PANEL - Abnormal; Notable for the following components:    Sodium 135 (*)     Glucose 104 (*)     All other components within normal limits    Narrative:     Performed at:  [facility] [address],  [place], [place]   Phone [phone]   LIPASE    Narrative:     Performed at:  [facility] [address],  [place], [place]   Phone [phone]   LACTATE, SEPSIS    Narrative:     Performed at:  [facility] [address],  [place]   Phone [phone]       All other labs were within normal range or not returned as of this dictation. EKG:  All EKG's are interpreted by the Emergency Department Physician in the absence of a cardiologist.  Please see their note for interpretation of EKG. RADIOLOGY:   Non-plain film images such as CT, Ultrasound and MRI are read by the radiologist. Plain radiographic images are visualized and preliminarily interpreted by the ED Provider with the below findings:        Interpretation per the Radiologist below, if available at the time of this note:    CT ABDOMEN PELVIS W IV CONTRAST Additional Contrast? None   Final Result   1. Liquid stool within the distal sigmoid colon and rectum suggestive of   nonspecific diarrheal disease. Mild gaseous dilation of the upstream sigmoid   colon. 2. Moderate to large stool load in the ascending and transverse colon which   could suggest a component of constipation. Recommend correlation with   patient's symptoms. 3. No perianal inflammatory changes or focal collections. No results found. PROCEDURES   Unless otherwise noted below, none     Procedures    CRITICAL CARE TIME   The total critical care time spent while evaluating and treating this patient was at least 32 minutes. This excludes time spent doing separately billable procedures. This includes time at the bedside, data interpretation, medication management, obtaining critical history from collateral sources if the patient is unable to provide it directly, and physician consultation. Specifics of interventions taken and potentially life-threatening diagnostic considerations are listed above in the medical decision making.       CONSULTS:  IP CONSULT TO HOSPITALIST  IP CONSULT TO GI      EMERGENCY DEPARTMENT COURSE and DIFFERENTIAL DIAGNOSIS/MDM:   Vitals:    Vitals:    09/27/20 2140 09/27/20 2200 09/27/20 2220 09/27/20 2240   BP: 121/64 (!) 118/57 117/64 123/65   Pulse: 94      Resp: 14      Temp:       TempSrc:       SpO2: 100% 100% 100% 99%   Weight:       Height:           Patient was given the following medications:  Medications ondansetron (ZOFRAN) injection 4 mg (4 mg Intravenous Given 9/27/20 2030)   PEG-KCl-NaCl-NaSulf-Na Asc-C (MOVIPREP) solution 100 g (has no administration in time range)   PEG-KCl-NaCl-NaSulf-Na Asc-C (MOVIPREP) solution 100 g (has no administration in time range)   0.9 % sodium chloride bolus (has no administration in time range)   morphine injection 4 mg (4 mg Intravenous Given 9/27/20 2032)   0.9 % sodium chloride bolus (0 mLs Intravenous Stopped 9/27/20 2151)   iopamidol (ISOVUE-370) 76 % injection 75 mL (75 mLs Intravenous Given 9/27/20 2127)           Briefly, this is a 31year old [facility] bright red [facility] bleeding with abdominal pain. Patient reports that he was initially seen in urgent care for rectal pain and mild bleeding, states that he was given an antibiotic and cream as well as being told to take sitz bath, this did not improve symptoms and he was then seen at the [facility] on 9/24/2020. He tells me that he was told to discontinue treatment at that point as they were unable to find a \"tear\", he was placed on Colace then to soften stool. He then left without being seen from UC on 9/26/2020. Arrives [facility] reporting that bleeding continues and he is experiencing severe abdominal discomfort, rates as 10 of 10 without mitigating exacerbating factors. Describes as sharp and stabbing. No vomiting or diarrhea. He is homosexual and does have anal sex. HIV positive, on antivirals. CBC shows significant drop in hemoglobin, this value was normal in May of this year and is now 8.1. CMP is unremarkable. Lipase normal.  Lactate 0.5. CT ABDOMEN PELVIS W IV CONTRAST Additional Contrast? None (Final result)   Result time 09/27/20 21:49:26   Final result by Sandy Martinez MD (09/27/20 21:49:26)                 Impression:     1.  Liquid stool within the distal sigmoid colon and rectum suggestive of   nonspecific diarrheal disease.  Mild gaseous dilation of the upstream sigmoid   colon. 2. Moderate to large stool load in the ascending and transverse colon which   could suggest a component of constipation.  Recommend correlation with   patient's symptoms. 3. No perianal inflammatory changes or focal collections. Patient was given a liter of IV fluids, morphine, and zofran. He will be admitted to hospitalist services. I did talk with GI, Dr. Ralph Paulino, he would like to bowel prep the patient this evening in the emergency department, gave specific orders and these were placed by myself. Acid the patient drink both of these immediately and then n.p.o.  Plan for colonoscopy in the morning. Hospitalist is agreeable to admit this patient. The patient is agreeable regarding plan of care. FINAL IMPRESSION      1. Gastrointestinal hemorrhage, unspecified gastrointestinal hemorrhage type    2. Anemia, unspecified type    3. Generalized abdominal pain          DISPOSITION/PLAN   DISPOSITION Admitted 09/27/2020 10:30:55 PM      PATIENT REFERREDTO:  No follow-up provider specified. DISCHARGE MEDICATIONS:  New Prescriptions    No medications on file       DISCONTINUED MEDICATIONS:  Discontinued Medications    STRIBILD [phone] MG TABLET    TAKE 1 TABLET BY MOUTH DAILY.               (Please note that portions of this note were completed with a voice recognition program.  Efforts were made to edit the dictations but occasionally words are mis-transcribed.)    ERNIE Leggett CNP (electronically signed)           ERNIE Leggett CNP  09/27/20 7065

## 2020-09-28 NOTE — PROGRESS NOTES
[facility]    9/28/2020 9:49 AM        Name: Yovani Pittman . Admitted: 9/27/2020  Primary Care Provider: Referring Not In System (Inactive) (Tel: None)            Subjective:    Patient with no chest pain nausea or vomiting. Is having left lower quadrant abdominal pain and did have  bright red blood mixed in with stool half an hour ago    Reviewed interval ancillary notes    Current Medications  sodium chloride flush 0.9 % injection 10 mL, 2 times per day  sodium chloride flush 0.9 % injection 10 mL, PRN  acetaminophen (TYLENOL) tablet 650 mg, Q6H PRN    Or  acetaminophen (TYLENOL) suppository 650 mg, Q6H PRN  polyethylene glycol (GLYCOLAX) packet 17 g, Daily PRN  promethazine (PHENERGAN) tablet 12.5 mg, Q6H PRN    Or  ondansetron (ZOFRAN) injection 4 mg, Q6H PRN  0.9 % sodium chloride infusion, Continuous  potassium chloride (KLOR-CON M) extended release tablet 40 mEq, PRN    Or  potassium bicarb-citric acid (EFFER-K) effervescent tablet 40 mEq, PRN    Or  potassium chloride 10 mEq/100 mL IVPB (Peripheral Line), PRN  magnesium sulfate 2 g in 50 mL IVPB premix, PRN  morphine (PF) injection 2 mg, Q4H PRN        Objective:  /69   Pulse 73   Temp 97.6 °F (36.4 °C) (Axillary)   Resp 16   Ht 5' 3\" (1.6 m)   Wt 128 lb 4.8 oz (58.2 kg)   SpO2 100%   BMI 22.73 kg/m²     Intake/Output Summary (Last 24 hours) at 9/28/2020 0949  Last data filed at 9/28/2020 0602  Gross per 24 hour   Intake 300 ml   Output --   Net 300 ml      Wt Readings from Last 3 Encounters:   09/28/20 128 lb 4.8 oz (58.2 kg)   05/10/20 127 lb (57.6 kg)   12/16/16 143 lb 4.8 oz (65 kg)       General appearance:  Appears comfortable.  AAOx3  HEENT: atraumatic, Pupils equal, muscous membranes moist, no masses appreciated  Cardiovascular: Regular rate and rhythm no murmurs appreciated  Respiratory: CTAB no wheezing  Gastrointestinal: Abdomen soft, nbs+ LLQ tenderness to palpation no guarding  EXT: no edema  Neurology: no gross focal deficts  Psychiatry: Appropriate affect. Not agitated  Skin: Warm, dry, no rashes appreciated    Labs and Tests:  CBC:   Recent Labs     09/27/20 2034 09/28/20  0209 09/28/20  0844   WBC 8.1  --  6.1   HGB 8.1* 6.8* 9.0*     --  226     BMP:    Recent Labs     09/27/20 2034   *   K 3.9      CO2 25   BUN 13   CREATININE 1.1   GLUCOSE 104*     Hepatic:   Recent Labs     09/27/20 2034   AST 19   ALT 14   BILITOT 0.4   ALKPHOS 74     CT ABDOMEN PELVIS W IV CONTRAST Additional Contrast? None   Final Result   1. Liquid stool within the distal sigmoid colon and rectum suggestive of   nonspecific diarrheal disease. Mild gaseous dilation of the upstream sigmoid   colon. 2. Moderate to large stool load in the ascending and transverse colon which   could suggest a component of constipation. Recommend correlation with   patient's symptoms. 3. No perianal inflammatory changes or focal collections. Recent imaging reviewed    Problem List  Active Problems:    GI bleed  Resolved Problems:    * No resolved hospital problems. *       Assessment & Plan:   Acute blood loss anemia secondary to likely lower GI bleed.   Status post 2 units PRBCs every 6 hours hemoglobin checks small transfuse to keep hemoglobin greater than 7 GI consult appreciated plan for colonoscopy today    H/o of vanessa resume home meds      Diet: Diet NPO Effective Now  Code:Full Code    Disposition home pending work up      Bonilla Oconnor MD   9/28/2020 9:49 AM

## 2020-09-28 NOTE — ANESTHESIA PRE PROCEDURE
Gisella Flores MD        morphine (PF) injection 2 mg  2 mg Intravenous Q4H PRN Gisella Flores MD   2 mg at 09/28/20 0831       Allergies: Allergies   Allergen Reactions    Singulair [Montelukast Sodium] Other (See Comments)     headache       Problem List:    Patient Active Problem List   Diagnosis Code    GI bleed K92.2       Past Medical History:        Diagnosis Date    Asthma     HIV (human immunodeficiency virus infection) ([facility] [address]        Past Surgical History:  History reviewed. No pertinent surgical history. Social History:    Social History     Tobacco Use    Smoking status: Current Every Day Smoker     Packs/day: 0.50     Types: Cigarettes   Substance Use Topics    Alcohol use: Yes     Comment: once weekly                                Ready to quit: Not Answered  Counseling given: Not Answered      Vital Signs (Current):   Vitals:    09/28/20 0618 09/28/20 0645 09/28/20 0730 09/28/20 0815   BP: 113/75 134/70 118/79 107/69   Pulse: 78 89  73   Resp: 18 18  16   Temp: 97.5 °F (36.4 °C) 97.3 °F (36.3 °C)  97.6 °F (36.4 °C)   TempSrc: Axillary Axillary  Axillary   SpO2: 100% 100%  100%   Weight:       Height:                                                  BP Readings from Last 3 Encounters:   09/28/20 107/69   05/10/20 138/81   05/09/20 130/70       NPO Status:                                                                                 BMI:   Wt Readings from Last 3 Encounters:   09/28/20 128 lb 4.8 oz (58.2 kg)   05/10/20 127 lb (57.6 kg)   12/16/16 143 lb 4.8 oz (65 kg)     Body mass index is 22.73 kg/m².     CBC:   Lab Results   Component Value Date    WBC 6.1 09/28/2020    RBC 3.13 09/28/2020    HGB 9.0 09/28/2020    HCT 26.7 09/28/2020    MCV 85.5 09/28/2020    RDW 14.9 09/28/2020     09/28/2020       CMP:   Lab Results   Component Value Date     09/28/2020    K 4.0 09/28/2020     09/28/2020    CO2 26 09/28/2020    BUN 13 09/28/2020    CREATININE 1.0 09/28/2020

## 2020-09-28 NOTE — ED PROVIDER NOTES
This patient was seen by the Mid-Level Provider. I have seen and examined the patient, agree with the workup, evaluation, management and diagnosis. Care plan has been discussed. My assessment reveals a 19-year-old male who presents with rectal bleeding and abdominal pain. This is a 19-year-old male with a history of HIV and who is acevedo who presents with the above complaints. The patient states he has had large amounts of bright red blood in the toilet with his bowel movements for the last 3 days. The patient does admit to having anal sex but states that it is been at least a month since that. The patient states is been evaluated for anal fissures but they have not found any. Radiology results:    CT ABDOMEN PELVIS W IV CONTRAST Additional Contrast? None   Final Result   1. Liquid stool within the distal sigmoid colon and rectum suggestive of   nonspecific diarrheal disease. Mild gaseous dilation of the upstream sigmoid   colon. 2. Moderate to large stool load in the ascending and transverse colon which   could suggest a component of constipation. Recommend correlation with   patient's symptoms. 3. No perianal inflammatory changes or focal collections.                LABS:    Labs Reviewed   CBC WITH AUTO DIFFERENTIAL - Abnormal; Notable for the following components:       Result Value    RBC 2.76 (*)     Hemoglobin 8.1 (*)     Hematocrit 23.6 (*)     All other components within normal limits    Narrative:     Performed at:  [facility] [address], [place], [address]   Phone [phone]   COMPREHENSIVE METABOLIC PANEL - Abnormal; Notable for the following components:    Sodium 135 (*)     Glucose 104 (*)     All other components within normal limits    Narrative:     Performed at:  [facility]  [address] [address] [facility]   Phone [phone]   LIPASE    Narrative:     Performed at:  [facility]  [address] [place],  [place], [address]   Phone [phone]   LACTATE, SEPSIS    Narrative:     Performed at:  [facility]  [address], [address]   Phone [phone]               Exam:    Well-nourished male in no acute distress. Abdomen is thin soft and benign. No guarding or rebound. Medical decision makin-year-old male presents with abdominal pain, rectal bleeding. His work-up is consistent with some significant anemia with a significant drop since May of this year. External exam does not show any obvious fissures or external abnormalities. The patient will be admitted for further care, possible transfusion and GI evaluation. He is in stable condition. FINAL IMPRESSION:    1. Gastrointestinal hemorrhage, unspecified gastrointestinal hemorrhage type    2. Anemia, unspecified type    3. Generalized abdominal pain      Critical CARE time: 40 minutes of critical care time spent with this patient with multiple visits to the bedside.      Loi Anderson MD  [phone]

## 2020-09-28 NOTE — ANESTHESIA POSTPROCEDURE EVALUATION
Department of Anesthesiology  Postprocedure Note    Patient: Delon Shukla  MRN: [id number]  YOB: 1995  Date of evaluation: 9/28/2020  Time:  3:46 PM     Procedure Summary     Date:  09/28/20 Room / Location:  [address] [place] [address]    Anesthesia Start:  3917 Anesthesia Stop:  0181    Procedure:  SIGMOIDOSCOPY BIOPSY FLEXIBLE (N/A ) Diagnosis:  (anemia/rectal bleeding)    Surgeon:  Alcides Delarosa MD Responsible Provider:  Sandra Paz MD    Anesthesia Type:  MAC ASA Status:  3          Anesthesia Type: MAC    Danyel Phase I: Danyel Score: 10    Danyel Phase II:      Last vitals: Reviewed and per EMR flowsheets.        Anesthesia Post Evaluation    Patient location during evaluation: PACU  Patient participation: complete - patient participated  Level of consciousness: awake  Airway patency: patent  Nausea & Vomiting: no vomiting  Complications: no  Cardiovascular status: hemodynamically stable  Respiratory status: acceptable  Hydration status: euvolemic

## 2020-09-28 NOTE — PROGRESS NOTES
Pt arrived to PACU from Endoscopy in Stable condition and is RASS 0 (Alert and Calm) . Respirations are Regular Pattern; RR 8-20 = 0 on 3L O2 per nasal cannula. Skin warm and dry. Abd is  soft. Pain: denies at this time. Will continue to monitor for safety and comfort. S/P: Colonoscopy with biopsy, with Dr. Margaret Sosa at [facility].

## 2020-09-28 NOTE — FLOWSHEET NOTE
Provider notified the following: \"IV attempt has been made 4x by 3 different RNs with no success as his veins keep blowing. Pt BP has come up some, last /62 , Respirations 18. Blood infusion 1 almost complete. I plan to call the PICC RN to come see him to start peripheral IV as soon as they get here. Would you like anything else done for the pt? Awaiting response.

## 2020-09-28 NOTE — H&P
_    [facility] HOSPITALIST HISTORY AND PHYSICAL    9/27/2020 10:42 PM    Patient Information:  Sherri Broderick is a 31 y.o. male [id number]    PCP:  Referring Not In System (Inactive) (Tel: None )    Date of Service:   Pt seen/examined on 9/27/2020   Admitted to Inpatient with expected LOS greater than two midnights due to medical therapy. Chief complaint:    Chief Complaint   Patient presents with    Abdominal Pain     WITH BRIGHT RED RECTAL BLEEDING FOR 3 DAYS. URGENT CARE TOLD HIM HE HAD A ANAL TEAR. DENIES VOMITING. + WEAKNESS       History of Present Illness:  Chantal Uribe is a 31 y.o. male who presented with   · Reports BRBPR X 5 days . On and off . Throughout the day . A/w diarrhea like stools . He notices blood when ever he wipes himself . Also reports worsening energy levels and inc SOB and BILLINGS in last 1-2 days . Unable to climb a flight of stairs at home this evening and got winded . Also reports diffuse Abd pain X 4-5 days . No Travel . No sick contacts . Has a h/o HIV . Practices Anal sex but has not done so since last 1-2 months . Reports compliance with his HIV medications . No similar complaints in Past reported. · In past 4-5 days , Was seen at [facility] and told that he had a Fissure . Sent home w/ rectal cream . Then seen @ [facility] and then tod that he had no fissure and he needed to do [employer] . Then went to  and he left before being seen in ED 2/2 amount of wait time. History and pertinent information obtained from   · ED provider   · Prior Chart    · Patient         So far, Notable/significant ED Findings/VItals :   · Tachy HR on arrival . Improved w. IVF         While in ED  · Patient care Given  · Monitoring done   · Pertinent Labs done and acute issues noted. · Imaging CT, done in ED . Acute issues noted as below. While in ED, Indicated/Pertinent Medications/Care given as below      · IVF   · Morphine X 1       · Imaging done in ED as below.  And is/are s/o diarrhea like stools in distal colon and inc stool load in proximal colon        · Pertinent Abnormal Labs and their interpretation/active and acute issues as mentioned below. Currently, on my evaluation, patient is :   · Since arrival, post above Rx, patient is AO X 3   · Reports feeling weak . Patient denies any acute complaints to me, needing interventions specifically & emergently @ Time of my evaluation. Patient is not in acute hemodynamically unstable distress from respiratory or cardiac standpoint @ time of my evaluation. REVIEW OF SYSTEMS:     Constitutional:  Negative for fever, chills or night sweats  ENT:   Negative for rhinorrhea, epistaxis, hoarseness, sore throat. Respiratory:   Negative for shortness of breath, wheezing  Cardiovascular:   Negative for  chest pain, palpitations   Gastrointestinal:  As above   Genitourinary:   Negative for polyuria, dysuria   Hematologic/Lymphatic:   Negative for  bleeding tendency, easy bruising  Musculoskeletal:   Negative for myalgias and arthralgias  Neurologic:   Negative for  Confusion, dysarthria. Skin:   Negative for itching,rash  Psychiatric:  Negative for depression, anxiety, agitation. Endocrine:  Negative for polydipsia, polyuria,heat /cold intolerance. Past Medical History:         has a past medical history of Asthma and HIV (human immunodeficiency virus infection) ([facility] [address] Past Surgical History:         has no past surgical history on file. Medications:        Current Outpatient Medications on File Prior to Encounter   Medication Sig Dispense Refill    Bictegravir-Emtricitab-Tenofov (BIKTARVY PO) Take by mouth      citalopram (CELEXA) 20 MG tablet Take 20 mg by mouth daily           Allergies: Allergies   Allergen Reactions    Singulair [Montelukast Sodium] Other (See Comments)     headache          Social History:   reports that he has been smoking cigarettes. He has been smoking about 0.50 packs per day.  He does not have any smokeless tobacco history on file. He reports current alcohol use. He reports that he does not use drugs. Family History:          History reviewed. No pertinent family history. Physical Exam:  BP (!) 118/57   Pulse 94   Temp 97.5 °F (36.4 °C) (Temporal)   Resp 14   Ht 5' 3\" (1.6 m)   Wt 127 lb (57.6 kg)   SpO2 100%   BMI 22.50 kg/m²     General appearance: Appears comfortable. Eyes:  Sclera clear, pupils equal  ENT:  Dry  mucus membranes, Trachea midline. Cardiovascular: Regular rhythm, normal S1, S2.    Respiratory:  Noted Clear to auscultation bilaterally,  No wheeze, good inspiratory effort   Gastrointestinal:  Abdomen soft, diffuse tender,  not distended,   Musculoskeletal:  No cyanosis in digits, neck supple  Neurology:  Cranial nerves grossly intact. Alert and oriented in time, place and person. No speech or motor deficits Psychiatry:  Appropriate affect. Not agitated  Skin:  Warm, dry, normal turgor, no rash       Labs:     Recent Labs     09/27/20 2034   WBC 8.1   HGB 8.1*   HCT 23.6*        Recent Labs     09/27/20 2034   *   K 3.9      CO2 25   BUN 13   CREATININE 1.1   CALCIUM 8.6     Recent Labs     09/27/20 2034   AST 19   ALT 14   BILITOT 0.4   ALKPHOS 74     No results for input(s): INR in the last 72 hours. No results for input(s): Joeline Reeks in the last 72 hours. Urinalysis:    No results found for: Floyde Handy, BACTERIA, RBCUA, BLOODU, [address]      Radiology:         IMAGING :     CT ABDOMEN PELVIS W IV CONTRAST Additional Contrast? None   Final Result   1. Liquid stool within the distal sigmoid colon and rectum suggestive of   nonspecific diarrheal disease. Mild gaseous dilation of the upstream sigmoid   colon. 2. Moderate to large stool load in the ascending and transverse colon which   could suggest a component of constipation. Recommend correlation with   patient's symptoms.    3. No perianal inflammatory changes or focal collections. PROBLEM LIST      Active Hospital Problems    Diagnosis Date Noted    GI bleed [K92.2] 09/27/2020       PLAN/ORDERS FOR THIS ADMISSION/HOSPITALIZATION       · BRBPR ? Diverticular Bleed vs Hemorrhoidal Bleed vs rectal/anal fissure . Keep NPO . IVF . Trend Hb . GI c/sed . Planned for C scope in AM . Follow restrictive transfusion protocol, PRBC Transfusion prn for Hb < 7/symptomatic anemia/active Blood loss. · Acute blood loss anemia 2/2 above . Trend Hb . Txn when Hb < 7 .   · Diarrheal illness . Send stool studies when able   · H/o HIV . Resume home medications . He follows us @ [facility] for his HIV RX . Reports compliance with Rx         · Home medications for chronic medical problems reviewed and Held / Resumed / Pertinent changes made [as mentioned above] in home medications, as clinically warranted/Indicated . See EPIC orders for details         · DVT Prophylaxis :  + SCDs   · Nutrition/Diet: Diet NPO Effective Now  · Code Status: No Order  · PT/OT and ambulatory Eval Status: Ambulate with Assist    · Probable LOS & future Disposition planned post discharge  - HOme in 1-2 days       Please see EPIC orders for detailed orders/recommendations of plan and medications. CONSULTS ORDERED @ ADMISSION   IP CONSULT TO HOSPITALIST   IP CONSULT TO GI      Medical Decision Making : HIGH     Total patient Care [ Direct and Indirect ] time spent in evaluating the patient an discussing plan with appropriate staff/patient/family members is 46 min       Trinity Le MD    Hospitalist, [facility].    9/27/2020 10:42 PM

## 2020-09-28 NOTE — OP NOTE
Operative Note      Patient: Alan Chavez  YOB: 1995  MRN: [id number]    Date of Procedure: 9/28/2020    Pre-Op Diagnosis: anemia/rectal bleeding         Procedure(s):  SIGMOIDOSCOPY BIOPSY FLEXIBLE    Surgeon(s):  Kelechi Camejo MD      Anesthesia: Monitor Anesthesia Care    Estimated Blood Loss (mL): Minimal    Complications: None    Specimens:   ID Type Source Tests Collected by Time Destination   A : bx rectum r/o HSV and CMV Tissue Tissue SURGICAL PATHOLOGY Kelechi Camejo MD 9/28/2020 1331    B : bx anal canal Tissue Tissue SURGICAL PATHOLOGY Kelechi Camejo MD 9/28/2020 1334        Findings:   Poor inadequate bowel prep. Patchy distal rectal erythema/erosions, biopsied (r/o HSV and CMV). Fleshy anal lesion, biopsied. Hemorrhoids. Plans  Await biopsies. Monitor H/H for now.  Miralax 17 grams BID      Electronically signed by Kelechi Camejo MD on 9/28/2020 at 1:47 PM

## 2020-09-28 NOTE — CONSULTS
Gastroenterology Consult Note      Patient: Paul Hoff  : 1995  Acct#:      Date:  2020    Subjective:       History of Present Illness  Patient is a 31 y.o.  male admitted with GI bleed [K92.2]  GI bleed [K92.2] who is seen in consult for hematochezia and abdominal pain. H/o HIV followed at [facility] on medication. He typically has 1-2 solid stools daily. A week ago he began having intermittent, left sided abdominal cramping pain with BMs and bloody diarrhea. Blood is bright red 1-2 times daily and is a lot of blood at a time. No N/V. No prior GI bleeding or colonoscopy. Was given bowel prep last night but only drank 1 L Movi prep. Past Medical History:   Diagnosis Date    Asthma     HIV (human immunodeficiency virus infection) ([facility] [address]       History reviewed. No pertinent surgical history. Past Endoscopic History: none     Admission Meds  No current facility-administered medications on file prior to encounter. Current Outpatient Medications on File Prior to Encounter   Medication Sig Dispense Refill    Bictegravir-Emtricitab-Tenofov (BIKTARVY PO) Take by mouth      citalopram (CELEXA) 20 MG tablet Take 20 mg by mouth daily              Allergies  Allergies   Allergen Reactions    Singulair [Montelukast Sodium] Other (See Comments)     headache      Social   Social History     Tobacco Use    Smoking status: Current Every Day Smoker     Packs/day: 0.50     Types: Cigarettes   Substance Use Topics    Alcohol use: Yes     Comment: once weekly        History reviewed. No pertinent family history.       Review of Systems  Constitutional: negative for fevers, chills, sweats    Ears, nose, mouth, throat, and face: negative for nasal congestion and sore throat   Respiratory: negative for cough and shortness of breath   Cardiovascular: negative for chest pain and dyspnea   Gastrointestinal: see hpi   Genitourinary:negative for dysuria and frequency Integument/breast: negative for pruritus and rash   Hematologic/lymphatic: negative for swelling and easy bruising   Musculoskeletal:negative for arthralgias and myalgias   Neurological: negative for dizziness and weakness         Physical Exam  Blood pressure 107/69, pulse 73, temperature 97.6 °F (36.4 °C), temperature source Axillary, resp. rate 16, height 5' 3\" (1.6 m), weight 128 lb 4.8 oz (58.2 kg), SpO2 100 %. General appearance: alert, cooperative, no distress, appears stated age  Eyes: Anicteric  Head: Normocephalic, without obvious abnormality  Lungs: clear to auscultation bilaterally, Normal Effort  Heart: regular rate and rhythm, normal S1 and S2, no murmurs or rubs  Abdomen: soft, left sided tenderness. Bowel sounds normal. No masses,  no organomegaly. Extremities: atraumatic, no cyanosis or edema  Skin: warm and dry, no jaundice  Neuro: Grossly intact, A&OX3  Musculoskeletal: 5/5  strength BUE      Data Review:    Recent Labs     09/27/20 2034 09/28/20  0209   WBC 8.1  --    HGB 8.1* 6.8*   HCT 23.6* 18.9*   MCV 85.7  --      --      Recent Labs     09/27/20 2034   *   K 3.9      CO2 25   BUN 13   CREATININE 1.1     Recent Labs     09/27/20 2034   AST 19   ALT 14   BILITOT 0.4   ALKPHOS 74     Recent Labs     09/27/20 2034   LIPASE 14.0     No results for input(s): PROTIME, INR in the last 72 hours. No results for input(s): PTT in the last 72 hours. No results for input(s): OCCULTBLD in the last 72 hours. Imaging Studies:                 CT-scan of abdomen and pelvis w IV contrast 9/27/20  Impression    1. Liquid stool within the distal sigmoid colon and rectum suggestive of    nonspecific diarrheal disease.  Mild gaseous dilation of the upstream sigmoid    colon. 2. Moderate to large stool load in the ascending and transverse colon which    could suggest a component of constipation.  Recommend correlation with    patient's symptoms.     3. No perianal inflammatory changes or focal collections. Assessment:     Active Problems:    GI bleed  Resolved Problems:    * No resolved hospital problems. *    Hematochezia, Left sided abdominal pain  - began one week ago. CT with solid stool in the right colon and liquid stool in the rectum. Acute blood loss anemia - hgb from 13 ->8 -> 6.8. HIV    Recommendations:   - finish bowel prep this morning  - then NPO  - f/u repeat h/h  - plan colonoscopy once adequately prepped  - f/u stool O&P, GI pathogens,  C.diff     Discussed with Dr. Robson Da Silva, PA-C  [address] [facility]    I have personally performed a face to face diagnostic evaluation on this patient. I have interviewed and examined the patient and I agree with the findings and recommended plan of care. In summary, my findings and plan are the followin yo AAM with HIV presenting with rectal bleeding and abdominal pain. He presents with 1 week of abdominal pain, diarrhea and rectal bleeding. He is a poor historian. He is homosexual and practices anal receptive sex. He had been to multiple ER and urgent care. Hb dropped to 6.8 (s/p 2 U pRBCS) --> 9. VSS abdomen soft and nontender   A/P> I suspect he has some infectious colitis. However, CT showed constipation, may be paradoxical diarrhea. Alternatively, bleeding could be anal fissure, hemorrhoidal bleeding or anal cancer. Will check stool studies and plan for colonoscopy today.      Yahaira Hubbard MD, MSc  [address]

## 2020-09-29 LAB
ANION GAP SERPL CALCULATED.3IONS-SCNC: 5 MMOL/L (ref 3–16)
APTT: 29.8 SEC (ref 24.2–36.2)
BLOOD BANK DISPENSE STATUS: NORMAL
BLOOD BANK DISPENSE STATUS: NORMAL
BLOOD BANK PRODUCT CODE: NORMAL
BLOOD BANK PRODUCT CODE: NORMAL
BPU ID: NORMAL
BPU ID: NORMAL
BUN BLDV-MCNC: 8 MG/DL (ref 7–20)
CALCIUM SERPL-MCNC: 7.5 MG/DL (ref 8.3–10.6)
CHLORIDE BLD-SCNC: 109 MMOL/L (ref 99–110)
CO2: 23 MMOL/L (ref 21–32)
CREAT SERPL-MCNC: 0.9 MG/DL (ref 0.9–1.3)
DESCRIPTION BLOOD BANK: NORMAL
DESCRIPTION BLOOD BANK: NORMAL
GFR AFRICAN AMERICAN: >60
GFR NON-AFRICAN AMERICAN: >60
GIARDIA ANTIGEN STOOL: NORMAL
GLUCOSE BLD-MCNC: 100 MG/DL (ref 70–99)
HAPTOGLOBIN: <10 MG/DL (ref 30–200)
HCT VFR BLD CALC: 22 % (ref 40.5–52.5)
HCT VFR BLD CALC: 26.5 % (ref 40.5–52.5)
HCT VFR BLD CALC: 26.6 % (ref 40.5–52.5)
HCT VFR BLD CALC: 29.5 % (ref 40.5–52.5)
HEMOGLOBIN: 10.1 G/DL (ref 13.5–17.5)
HEMOGLOBIN: 7.3 G/DL (ref 13.5–17.5)
HEMOGLOBIN: 9.1 G/DL (ref 13.5–17.5)
HEMOGLOBIN: 9.3 G/DL (ref 13.5–17.5)
INR BLD: 1.17 (ref 0.86–1.14)
LACTATE DEHYDROGENASE: 186 U/L (ref 100–190)
POTASSIUM REFLEX MAGNESIUM: 3.9 MMOL/L (ref 3.5–5.1)
PROTHROMBIN TIME: 13.6 SEC (ref 10–13.2)
SODIUM BLD-SCNC: 137 MMOL/L (ref 136–145)

## 2020-09-29 PROCEDURE — 99223 1ST HOSP IP/OBS HIGH 75: CPT | Performed by: INTERNAL MEDICINE

## 2020-09-29 PROCEDURE — 85730 THROMBOPLASTIN TIME PARTIAL: CPT

## 2020-09-29 PROCEDURE — 2580000003 HC RX 258: Performed by: HOSPITALIST

## 2020-09-29 PROCEDURE — 85014 HEMATOCRIT: CPT

## 2020-09-29 PROCEDURE — 83615 LACTATE (LD) (LDH) ENZYME: CPT

## 2020-09-29 PROCEDURE — 85610 PROTHROMBIN TIME: CPT

## 2020-09-29 PROCEDURE — 80048 BASIC METABOLIC PNL TOTAL CA: CPT

## 2020-09-29 PROCEDURE — 2000000000 HC ICU R&B

## 2020-09-29 PROCEDURE — 6370000000 HC RX 637 (ALT 250 FOR IP): Performed by: PHYSICIAN ASSISTANT

## 2020-09-29 PROCEDURE — 83010 ASSAY OF HAPTOGLOBIN QUANT: CPT

## 2020-09-29 PROCEDURE — 36415 COLL VENOUS BLD VENIPUNCTURE: CPT

## 2020-09-29 PROCEDURE — 2580000003 HC RX 258: Performed by: INTERNAL MEDICINE

## 2020-09-29 PROCEDURE — 85018 HEMOGLOBIN: CPT

## 2020-09-29 RX ORDER — PEG-3350, SODIUM SULFATE, SODIUM CHLORIDE, POTASSIUM CHLORIDE, SODIUM ASCORBATE AND ASCORBIC ACID 7.5-2.691G
100 KIT ORAL ONCE
Status: DISCONTINUED | OUTPATIENT
Start: 2020-09-29 | End: 2020-10-01 | Stop reason: HOSPADM

## 2020-09-29 RX ORDER — PEG-3350, SODIUM SULFATE, SODIUM CHLORIDE, POTASSIUM CHLORIDE, SODIUM ASCORBATE AND ASCORBIC ACID 7.5-2.691G
100 KIT ORAL ONCE
Status: COMPLETED | OUTPATIENT
Start: 2020-09-29 | End: 2020-09-29

## 2020-09-29 RX ORDER — 0.9 % SODIUM CHLORIDE 0.9 %
20 INTRAVENOUS SOLUTION INTRAVENOUS ONCE
Status: COMPLETED | OUTPATIENT
Start: 2020-09-29 | End: 2020-09-29

## 2020-09-29 RX ORDER — 0.9 % SODIUM CHLORIDE 0.9 %
1000 INTRAVENOUS SOLUTION INTRAVENOUS ONCE
Status: COMPLETED | OUTPATIENT
Start: 2020-09-29 | End: 2020-09-29

## 2020-09-29 RX ADMIN — SODIUM CHLORIDE 1000 ML: 9 INJECTION, SOLUTION INTRAVENOUS at 04:12

## 2020-09-29 RX ADMIN — SODIUM CHLORIDE 20 ML: 9 INJECTION, SOLUTION INTRAVENOUS at 04:12

## 2020-09-29 RX ADMIN — Medication 10 ML: at 20:35

## 2020-09-29 RX ADMIN — Medication 10 ML: at 09:00

## 2020-09-29 RX ADMIN — SODIUM CHLORIDE: 9 INJECTION, SOLUTION INTRAVENOUS at 11:10

## 2020-09-29 RX ADMIN — SODIUM CHLORIDE: 9 INJECTION, SOLUTION INTRAVENOUS at 20:36

## 2020-09-29 RX ADMIN — POLYETHYLENE GLYCOL 3350, SODIUM SULFATE, SODIUM CHLORIDE, POTASSIUM CHLORIDE, ASCORBIC ACID, SODIUM ASCORBATE 100 G: KIT at 16:08

## 2020-09-29 ASSESSMENT — PAIN SCALES - GENERAL
PAINLEVEL_OUTOF10: 0
PAINLEVEL_OUTOF10: 10
PAINLEVEL_OUTOF10: 0

## 2020-09-29 ASSESSMENT — PAIN DESCRIPTION - PROGRESSION: CLINICAL_PROGRESSION: NOT CHANGED

## 2020-09-29 NOTE — PROGRESS NOTES
Pt stood to urinate and blood cam out of rectum and was running down his legs. Pt then became tearful and started crying. Pt was cleaned directed back to bed, vitals obtained, doctor notified, and pt educated on the importance of not getting up without someone in the room. Pt more calm and verbalized understanding.

## 2020-09-29 NOTE — PROGRESS NOTES
[facility] GI  Gastroenterology Progress Note    Gabby Ndiaye is a 31 y.o. male patient. 1. Gastrointestinal hemorrhage, unspecified gastrointestinal hemorrhage type    2. Anemia, unspecified type    3. Generalized abdominal pain        SUBJECTIVE:  Passed BRBPR last night and was hypotensive. Received 2 u PRBC overnight. No further bleeding. ROS:  Cardiovascular ROS: no chest pain or dyspnea on exertion  Gastrointestinal ROS: no abdominal pain, N/V  Respiratory ROS: no cough, shortness of breath, or wheezing    Physical    VITALS:  BP (!) 101/53   Pulse 80   Temp 97.9 °F (36.6 °C) (Temporal)   Resp 12   Ht 5' 3\" (1.6 m)   Wt 130 lb 1.1 oz (59 kg)   SpO2 99%   BMI 23.04 kg/m²   TEMPERATURE:  Current - Temp: 97.9 °F (36.6 °C); Max - Temp  Av.6 °F (36.4 °C)  Min: 97.2 °F (36.2 °C)  Max: 98 °F (36.7 °C)    NAD  RRR, Nl s1s2  Lungs CTA Bilaterally, normal effort  Abdomen soft, ND, NT, no HSM, Bowel sounds normal  AAOx3, No asterixis     Data    Data Review:    Recent Labs     2012 20  1308   WBC 8.1  --  6.1  --   --   --   --    HGB 8.1*   < > 9.0*   < > 7.3* 10.1* 9.3*   HCT 23.6*   < > 26.7*   < > 22.0* 29.5* 26.5*   MCV 85.7  --  85.5  --   --   --   --      --  226  --   --   --   --     < > = values in this interval not displayed. Recent Labs     2044 20  025   * 141 137   K 3.9 4.0 3.9    109 109   CO2 25 26 23   BUN 13 13 8   CREATININE 1.1 1.0 0.9     Recent Labs     20   AST 19 20   ALT 14 13   BILITOT 0.4 0.6   ALKPHOS 74 71     Recent Labs     20  2034   LIPASE 14.0     Recent Labs     20  0912   PROTIME 13.6*   INR 1.17*     No results for input(s): PTT in the last 72 hours. Colonoscopy 20  Findings:   Poor inadequate bowel prep. Patchy distal rectal erythema/erosions, biopsied (r/o HSV and CMV). Fleshy anal lesion, biopsied. Hemorrhoids.       ASSESSMENT     Hematochezia, Left sided abdominal pain  - began one week PTA. CT with solid stool in the right colon and liquid stool in the rectum. Colonoscopy 9/28 with poor prep, patchy distal rectal erosions, anal lesion, hemorrhoids. Bleeding was felt to be from anal lesion. Acute blood loss anemia - hgb from 13 ->8 -> 6.8. he received 2 U PRBC and hgb incremented to 9.  hgb dropped to 7.3 this am and is now 10 with 2 u PRBC. HIV      PLAN    - f/u path  - f/u stool studies  - monitor hgb  - miralax   - if rebleeds, would check tagged RBC scan  - repeat colonoscopy tomorrow (poor prep on Monday) and EGD     Discussed with Dr. Praveen Finn, PA-C  [address]      I have personally performed a face to face diagnostic evaluation on this patient. I have interviewed and examined the patient and I agree with the findings and recommended plan of care. In summary, my findings and plan are the following:     Events overnight noted. He had rectal bleeding and hypotension. He required 2 units pRBCs overnight and transferred to the ICU. Hb today 9.3 (from 7.3 before transfusion). He has no recurrent bleeding today and feels well. VSS abdomen soft and non tender  He has recurrent rectal bleed associated with precipitous drop in his Hb this admission (required total 4 U pRBCs). His colonoscopy (poor/inadequate prep) yesterday was under whelming (mild rectal erythema, hemorrhoids, anal papilloma). We will plan on re-prepping today for repeat colonoscopy tomorrow, EGD if colonoscopy negative. In the meantime, if it happens again overnight, will ask for stat RBC scan.        Marcie Sosa MD, MSc  [address]

## 2020-09-29 NOTE — PROGRESS NOTES
[facility]    9/29/2020 8:57 AM        Name: Lizeth Nascimento . Admitted: 9/27/2020  Primary Care Provider: Referring Not In System (Inactive) (Tel: None)            Subjective:    Patient hypotensive overnight. Had large bleed given 2 units of PRBC stat. Blood pressure stabilized has had no further bleeding since then    Reviewed interval ancillary notes    Current Medications  sodium chloride flush 0.9 % injection 10 mL, 2 times per day  sodium chloride flush 0.9 % injection 10 mL, PRN  acetaminophen (TYLENOL) tablet 650 mg, Q6H PRN    Or  acetaminophen (TYLENOL) suppository 650 mg, Q6H PRN  polyethylene glycol (GLYCOLAX) packet 17 g, Daily PRN  promethazine (PHENERGAN) tablet 12.5 mg, Q6H PRN    Or  ondansetron (ZOFRAN) injection 4 mg, Q6H PRN  0.9 % sodium chloride infusion, Continuous  potassium chloride (KLOR-CON M) extended release tablet 40 mEq, PRN    Or  potassium bicarb-citric acid (EFFER-K) effervescent tablet 40 mEq, PRN    Or  potassium chloride 10 mEq/100 mL IVPB (Peripheral Line), PRN  magnesium sulfate 2 g in 50 mL IVPB premix, PRN  morphine (PF) injection 2 mg, Q4H PRN        Objective:  BP (!) 101/51   Pulse 66   Temp 97.9 °F (36.6 °C) (Temporal)   Resp 12   Ht 5' 3\" (1.6 m)   Wt 130 lb 1.1 oz (59 kg)   SpO2 99%   BMI 23.04 kg/m²     Intake/Output Summary (Last 24 hours) at 9/29/2020 0857  Last data filed at 9/29/2020 0645  Gross per 24 hour   Intake 4384.19 ml   Output --   Net 4384.19 ml      Wt Readings from Last 3 Encounters:   09/29/20 130 lb 1.1 oz (59 kg)   05/10/20 127 lb (57.6 kg)   12/16/16 143 lb 4.8 oz (65 kg)       General appearance:  Appears comfortable.  AAOx3  HEENT: atraumatic, Pupils equal, muscous membranes moist, no masses appreciated  Cardiovascular: Regular rate and rhythm no murmurs appreciated  Respiratory: CTAB no wheezing  Gastrointestinal: Abdomen soft, nbs+ LLQ tenderness to palpation no guarding  EXT: no edema  Neurology: no gross focal deficts  Psychiatry: Appropriate affect. Not agitated  Skin: Warm, dry, no rashes appreciated    Labs and Tests:  CBC:   Recent Labs     09/27/20 2034 09/28/20 0844 09/28/20  1533 09/28/20  2138 09/29/20  0251   WBC 8.1  --  6.1  --   --   --    HGB 8.1*   < > 9.0* 9.5* 9.9* 7.3*     --  226  --   --   --     < > = values in this interval not displayed. BMP:    Recent Labs     09/27/20 2034 09/28/20  0844 09/29/20  0251   * 141 137   K 3.9 4.0 3.9    109 109   CO2 25 26 23   BUN 13 13 8   CREATININE 1.1 1.0 0.9   GLUCOSE 104* 81 100*     Hepatic:   Recent Labs     09/27/20 2034 09/28/20  0844   AST 19 20   ALT 14 13   BILITOT 0.4 0.6   ALKPHOS 74 71     CT ABDOMEN PELVIS W IV CONTRAST Additional Contrast? None   Final Result   1. Liquid stool within the distal sigmoid colon and rectum suggestive of   nonspecific diarrheal disease. Mild gaseous dilation of the upstream sigmoid   colon. 2. Moderate to large stool load in the ascending and transverse colon which   could suggest a component of constipation. Recommend correlation with   patient's symptoms. 3. No perianal inflammatory changes or focal collections. Recent imaging reviewed    Problem List  Active Problems:    GI bleed  Resolved Problems:    * No resolved hospital problems. *       Assessment & Plan:   Acute blood loss anemia secondary to likely lower GI bleed.   Status post 4 units PRBCs , colonscopy with distal rectal erythema/erosions and anal lesion + hemmorhoids  - biopsies pending  - q6hr hgb transfuse to [facility] hgb>7    H/o of vanessa resume home meds      Diet: DIET FULL LIQUID;  Code:Full Code    Disposition home pending work up      Kathleen Jesus MD   9/29/2020 8:57 AM

## 2020-09-29 NOTE — PROGRESS NOTES
Clinical Pharmacy Note    Pharmacy can not supply Biktarvy. I have notified the nurse and requested the medication be brought from home. The medication is anticipated to be available by tomorrow. Please follow to ensure that medication therapy needs are met.     Thanks,  Darrell Chau, PharmD  PGY1 Pharmacy Resident  [id number]

## 2020-09-29 NOTE — FLOWSHEET NOTE
Doctor notified the following: \"Pt called out and had bleed a large amount of bright red blood that had saturated a large pad, part of his gown, and he got onto the bedside so we could change bed and clean him where he had a large liquid BM that is also bloody. Pt states that he feels weak. , BP 68/48, temp 98.0, 100% room air, [id number].  Please advise\"

## 2020-09-29 NOTE — PROGRESS NOTES
Pt to ICU and transferred to ICU bed and placed on monitor, vital signs obtained. Dr. Ania Mejias to bedside to evaluate pt. STAT H&H ordered. Per Dr. Ladonna Greer blood now do not wait for H&H result. \" Pt fatigued but alert and oriented x4 and following commands. Pt NSR. Pt on room air. Pt has R forearm peripheral IV access, see flowsheets. Pt has NS gtt infusing, see MAR. Pt provided with urinal. Pt repositioned in bed. Call light within reach. 1 of 2 units of PRBC started @0302, see flowsheets. Stayed with pt for 15 minutes-no distress and VSS. Will continue to monitor.

## 2020-09-29 NOTE — FLOWSHEET NOTE
Provider sent the following: \"Pt came in for rectal bleeding and abd pain yesterday. I gave him 2 units of blood d/t hbg being 6.7. It is now 13.7. Pt had colonoscopy today that showed \"Patchy distal rectal erythema/erosions, biopsied (r/o HSV and CMV). Fleshy anal lesion, biopsied. Hemorrhoids. \" Pt tolerated clear liquid fine and when he stood to urinate bright read blood came out of his anus, around 150 mL. Pt just had labs drawn, is in a small amount of abd pain and last vitals were 97.9 Oral temp, HR 88, /73, MAP 86, Respirations 19. Please advise. \" Awaiting response.

## 2020-09-29 NOTE — FLOWSHEET NOTE
Doctor messaged the following: \"Pt last Hemoglobin 9.9, and hematocrit 28.9 at 2138. No further bleeding after pt returned to bed and calmed down and cleaned up. \" Will continue to monitor.

## 2020-09-29 NOTE — CONSULTS
PULMONARY AND CRITICAL CARE MEDICINE CONSULTATION NOTE    CONSULTING PHYSICIAN:  Young Cameron MD     ADMISSION DATE: 9/27/2020  ADMISSION DIAGNOSIS: GI bleed [K92.2]  GI bleed [K92.2]    REASON FOR CONSULT:   Chief Complaint   Patient presents with    Abdominal Pain     WITH BRIGHT RED RECTAL BLEEDING FOR 3 DAYS. URGENT CARE TOLD HIM HE HAD A ANAL TEAR. DENIES VOMITING. + WEAKNESS       DATE OF CONSULT: 9/27/2020    HISTORY OF PRESENT ILLNESS: 31y.o. year old male with a past medical history significant for HIV who presented to the hospital with bright red blood per rectum for 5 days with generalized abdominal pain and fatigue. Patient has been practicing anal sex in the past but has not done so for the last 1 to 2 months. Apparently compliant with his HIV medications. Found to have low hemoglobin at the time of admission. Received 4 units of PRBC. Underwent colonoscopy yesterday showing hemorrhoids and anal lesion. At the time of interview patient appears in no apparent distress. Reports that his abdominal pain is better. He is feeling better and his appetite has improved. No further GI bleed seen. REVIEW OF SYSTEMS:     CONSTITUTIONAL SYMPTOMS: The patient denies fever, fatigue, night sweats, weight loss or weight gain. HEENT: No vision changes. No tinnitus, Denies sinus pain. No hoarseness, or dysphagia. NECK: Patient denies swelling in the neck. CARDIOVASCULAR: Denies chest pain, palpitation, syncope. RESPIRATORY: Denies shortness of breath or cough. GASTROINTESTINAL: Denies nausea, abdominal pain or change in bowel function. GENITOURINARY: Denies obstructive symptoms. No history of incontinence. BREASTS: No masses or lumps in the breasts. SKIN: No rashes or itching. MUSCULOSKELETAL: Denies weakness or bone pain. NEUROLOGICAL: No headaches or seizures. PSYCHIATRIC: Denies mood swings or depression.    ENDOCRINE: Denies heat or cold intolerance or excessive thirst.  HEMATOLOGIC/LYMPHATIC: Denies easy bruising or lymph node swelling. ALLERGIC/IMMUNOLOGIC: No environmental allergies. PAST MEDICAL HISTORY:   Past Medical History:   Diagnosis Date    Asthma     HIV (human immunodeficiency virus infection) ([address]        PAST SURGICAL HISTORY:   Past Surgical History:   Procedure Laterality Date    SIGMOIDOSCOPY N/A 9/28/2020    SIGMOIDOSCOPY BIOPSY FLEXIBLE performed by Shane Shah MD at [facility]:   Social History     Tobacco Use    Smoking status: Current Every Day Smoker     Packs/day: 0.50     Types: Cigarettes   Substance Use Topics    Alcohol use: Yes     Comment: once weekly    Drug use: No       FAMILY HISTORY: History reviewed. No pertinent family history. MEDICATIONS:     No current facility-administered medications on file prior to encounter. Current Outpatient Medications on File Prior to Encounter   Medication Sig Dispense Refill    Bictegravir-Emtricitab-Tenofov (BIKTARVY PO) Take by mouth      citalopram (CELEXA) 20 MG tablet Take 20 mg by mouth daily          bictegravir-emtricitab-tenofovir alafenamide  1 tablet Oral Daily    sodium chloride flush  10 mL Intravenous 2 times per day      sodium chloride 125 mL/hr at 09/29/20 1110     sodium chloride flush, acetaminophen **OR** acetaminophen, polyethylene glycol, promethazine **OR** ondansetron, potassium chloride **OR** potassium alternative oral replacement **OR** potassium chloride, magnesium sulfate, morphine      ALLERGIES:   Allergies as of 09/27/2020 - Review Complete 09/27/2020   Allergen Reaction Noted    Singulair [montelukast sodium] Other (See Comments) 12/16/2016      OBJECTIVE:   height is 5' 3\" (1.6 m) and weight is 130 lb 1.1 oz (59 kg). His temporal temperature is 97.9 °F (36.6 °C). His blood pressure is 101/53 (abnormal) and his pulse is 80. His respiration is 12 and oxygen saturation is 99%. No intake/output data recorded.      PHYSICAL EXAM:    CONSTITUTIONAL: He is a 31y.o.-year-old who appears his stated age. He is alert and oriented x 3 and in no acute distress. HEENT: PERRLA, EOMI. No scleral icterus. No thrush, atraumatic, normocephalic. NECK: Supple, without cervical or supraclavicular lymphadenopathy:  CARDIOVASCULAR: S1 S2 RRR. Without murmer  RESPIRATORY & CHEST: Lungs are clear to auscultation and percussion. No wheezing, no crackles. Good air movement  GASTROINTESTINAL & ABDOMEN: Soft, nontender, positive bowel sounds in all quadrants, non-distended, without hepatosplenomegaly. GENITOURINARY: Deferred. MUSCULOSKELETAL: No tenderness to palpation of the axial skeleton. There is no clubbing. No cyanosis. No edema of the lower extremities. SKIN OF BODY: No rash or jaundice. PSYCHIATRIC EVALUATION: Normal affect. Patient answers questions appropriately. HEMATOLOGIC/LYMPHATIC/ IMMUNOLOGIC: No palpable lymphadenopathy. NEUROLOGIC: Alert and oriented x 3. Groslly non-focal. Motor strength is 5+/5 in all muscle groups. The patient has a normal sensorium globally. LABS:  Recent Labs     09/27/20 2034 09/28/20  0844  09/28/20  2138 09/29/20  0251 09/29/20  0912   WBC 8.1  --  6.1  --   --   --   --    HGB 8.1*   < > 9.0*   < > 9.9* 7.3* 10.1*   HCT 23.6*   < > 26.7*   < > 28.9* 22.0* 29.5*     --  226  --   --   --   --    ALT 14  --  13  --   --   --   --    AST 19  --  20  --   --   --   --    *  --  141  --   --  137  --    K 3.9  --  4.0  --   --  3.9  --      --  109  --   --  109  --    CREATININE 1.1  --  1.0  --   --  0.9  --    BUN 13  --  13  --   --  8  --    CO2 25  --  26  --   --  23  --    INR  --   --   --   --   --   --  1.17*    < > = values in this interval not displayed.        Recent Labs     09/27/20 2034 09/28/20  0844 09/29/20  0251   GLUCOSE 104* 81 100*   CALCIUM 8.6 7.8* 7.5*   * 141 137   K 3.9 4.0 3.9   CO2 25 26 23    109 109   BUN 13 13 8   CREATININE 1.1 1.0 0.9 No results for input(s): PHART, HGZ7IBI, PO2ART, RJD2VMP, E9ZPHUKJ, BEART, I3RFIKNE in the last 72 hours. Lab Results   Component Value Date    INR 1.17 (H) 09/29/2020    PROTIME 13.6 (H) 09/29/2020     No results found for: AMYLASE   No results found for: LABA1C  No results found for: EAG  No results found for: TSH, F3OEGRW, L0VDAOA, THYROIDAB, FT3, T4FREE  No results found for: CKTOTAL, CKMB, CKMBINDEX, TROPONINI   Lab Results   Component Value Date    .4 (H) 05/09/2020      No results found for: BNP   No results found for: DDIMER   No results found for: FERRITIN   No results found for: LACTA    [unfilled]     IMAGING:    Narrative    EXAMINATION:    CT OF THE ABDOMEN AND PELVIS WITH CONTRAST 9/27/2020 9:27 pm         TECHNIQUE:    CT of the abdomen and pelvis was performed with the administration of    intravenous contrast. Multiplanar reformatted images are provided for review. Dose modulation, iterative reconstruction, and/or weight based adjustment of    the mA/kV was utilized to reduce the radiation dose to as low as reasonably    achievable.         COMPARISON:    None.         HISTORY:    ORDERING SYSTEM PROVIDED HISTORY: abd pain, brrb    TECHNOLOGIST PROVIDED HISTORY:    If patient is on cardiac monitor and/or pulse ox, they may be taken off    cardiac monitor and pulse ox, left on O2 if currently on. All monitors    reattached when patient returns to room. Additional Contrast?-> none    Reason for exam:-> abd pain, brrb    Reason for Exam: Abdominal Pain (WITH BRIGHT RED RECTAL BLEEDING FOR 3 DAYS. URGENT CARE TOLD HIM HE HAD A ANAL TEAR. DENIES VOMITING. + WEAKNESS)    Acuity: Acute    Type of Exam: Initial         FINDINGS:    Lower Chest: The visualized lung bases and lower mediastinal structures are    unremarkable.         Organs: The liver, gallbladder, spleen, pancreas, adrenal glands, and kidneys    are normal.  No renal stones or hydronephrosis.         GI/Bowel:  The small bowel is nondilated.  There is moderate to large stool    load within the ascending and transverse colon.  The sigmoid colon is    redundant and mildly dilated proximally with the downstream sigmoid colon    normal in caliber but containing liquid stool.  No significant wall    thickening of the colon.  No perianal fat stranding or focal collections.         Pelvis: Prostate, seminal vesicles, and urinary bladder are normal.         Peritoneum/Retroperitoneum: No significant lymphadenopathy.  The abdominal    aorta is normal in course and caliber.  There is paucity of intra-abdominal    fat.         Bones/Soft Tissues: No acute soft tissue or osseous abnormality.              Impression    1. Liquid stool within the distal sigmoid colon and rectum suggestive of    nonspecific diarrheal disease.  Mild gaseous dilation of the upstream sigmoid    colon. 2. Moderate to large stool load in the ascending and transverse colon which    could suggest a component of constipation.  Recommend correlation with    patient's symptoms. 3. No perianal inflammatory changes or focal collections. IMPRESSION:     1. Acute lower GI bleed  2. Hemorrhoids  3. Symptomatic anemia  4. HIV on HAART  5. Nicotine dependence    RECOMMENDATION:     1. Patient presented with lower GI bleed. Colonoscopy done emergently showed hemorrhoids and anal lesion. This has been biopsied to rule out HSV/CMV disease. 2.  No further lower GI bleed seen. 3.  Did receive 3 units of PRBC overnight. Hemoglobin has significantly improved. Patient is feeling better. 4.  We will continue to get serial H&H.    5.  Patient's CD4 count unknown as his management is at [facility]. In February 2020 his viral count was undetectable. As per the patient is compliant with HAART. 6.  Encourage p.o. diet. 7.  Blood pressure is soft but patient is not tachycardic. Currently receiving IV fluids at 125 cc/h.   With ongoing fluid resuscitation his blood pressure should improve. No indications for vasopressors at this time. Thank you for your consultation. Tamera López MD  Pulmonary Critical Care and Sleep Medicine  9/27/2020, 1:24 PM    This note was completed using dragon medical speech recognition software. Grammatical errors, random word insertions, pronoun errors and incomplete sentences are occasional consequences of this technology due to software limitations. If there are questions or concerns about the content of this note of information contained within the body of this dictation they should be addressed with the provider for clarification.

## 2020-09-30 ENCOUNTER — ANESTHESIA EVENT (OUTPATIENT)
Dept: ENDOSCOPY | Age: 25
DRG: 254 | End: 2020-09-30
Payer: MEDICAID

## 2020-09-30 ENCOUNTER — ANESTHESIA (OUTPATIENT)
Dept: ENDOSCOPY | Age: 25
DRG: 254 | End: 2020-09-30
Payer: MEDICAID

## 2020-09-30 VITALS
RESPIRATION RATE: 1 BRPM | DIASTOLIC BLOOD PRESSURE: 53 MMHG | SYSTOLIC BLOOD PRESSURE: 96 MMHG | OXYGEN SATURATION: 100 %

## 2020-09-30 LAB
ANION GAP SERPL CALCULATED.3IONS-SCNC: 2 MMOL/L (ref 3–16)
BUN BLDV-MCNC: 4 MG/DL (ref 7–20)
CALCIUM SERPL-MCNC: 7.7 MG/DL (ref 8.3–10.6)
CHLORIDE BLD-SCNC: 109 MMOL/L (ref 99–110)
CO2: 27 MMOL/L (ref 21–32)
CREAT SERPL-MCNC: 0.8 MG/DL (ref 0.9–1.3)
GFR AFRICAN AMERICAN: >60
GFR NON-AFRICAN AMERICAN: >60
GLUCOSE BLD-MCNC: 97 MG/DL (ref 70–99)
HCT VFR BLD CALC: 26.8 % (ref 40.5–52.5)
HEMOGLOBIN: 9.2 G/DL (ref 13.5–17.5)
MCH RBC QN AUTO: 30.1 PG (ref 26–34)
MCHC RBC AUTO-ENTMCNC: 34.4 G/DL (ref 31–36)
MCV RBC AUTO: 87.6 FL (ref 80–100)
PDW BLD-RTO: 15.5 % (ref 12.4–15.4)
PLATELET # BLD: 190 K/UL (ref 135–450)
PMV BLD AUTO: 6.2 FL (ref 5–10.5)
POTASSIUM REFLEX MAGNESIUM: 4.3 MMOL/L (ref 3.5–5.1)
RBC # BLD: 3.06 M/UL (ref 4.2–5.9)
ROTAVIRUS ANTIGEN: NEGATIVE
SODIUM BLD-SCNC: 138 MMOL/L (ref 136–145)
WBC # BLD: 4.8 K/UL (ref 4–11)

## 2020-09-30 PROCEDURE — 36415 COLL VENOUS BLD VENIPUNCTURE: CPT

## 2020-09-30 PROCEDURE — 2580000003 HC RX 258: Performed by: INTERNAL MEDICINE

## 2020-09-30 PROCEDURE — 94760 N-INVAS EAR/PLS OXIMETRY 1: CPT

## 2020-09-30 PROCEDURE — 2000000000 HC ICU R&B

## 2020-09-30 PROCEDURE — 3700000000 HC ANESTHESIA ATTENDED CARE: Performed by: INTERNAL MEDICINE

## 2020-09-30 PROCEDURE — 3700000001 HC ADD 15 MINUTES (ANESTHESIA): Performed by: INTERNAL MEDICINE

## 2020-09-30 PROCEDURE — 1200000000 HC SEMI PRIVATE

## 2020-09-30 PROCEDURE — 85027 COMPLETE CBC AUTOMATED: CPT

## 2020-09-30 PROCEDURE — 0DJD8ZZ INSPECTION OF LOWER INTESTINAL TRACT, VIA NATURAL OR ARTIFICIAL OPENING ENDOSCOPIC: ICD-10-PCS | Performed by: INTERNAL MEDICINE

## 2020-09-30 PROCEDURE — 3609017100 HC EGD: Performed by: INTERNAL MEDICINE

## 2020-09-30 PROCEDURE — 2580000003 HC RX 258: Performed by: NURSE ANESTHETIST, CERTIFIED REGISTERED

## 2020-09-30 PROCEDURE — 0DJ08ZZ INSPECTION OF UPPER INTESTINAL TRACT, VIA NATURAL OR ARTIFICIAL OPENING ENDOSCOPIC: ICD-10-PCS | Performed by: INTERNAL MEDICINE

## 2020-09-30 PROCEDURE — 7100000000 HC PACU RECOVERY - FIRST 15 MIN: Performed by: INTERNAL MEDICINE

## 2020-09-30 PROCEDURE — 80048 BASIC METABOLIC PNL TOTAL CA: CPT

## 2020-09-30 PROCEDURE — 2500000003 HC RX 250 WO HCPCS: Performed by: NURSE ANESTHETIST, CERTIFIED REGISTERED

## 2020-09-30 PROCEDURE — 6360000002 HC RX W HCPCS: Performed by: NURSE ANESTHETIST, CERTIFIED REGISTERED

## 2020-09-30 PROCEDURE — 2580000003 HC RX 258: Performed by: HOSPITALIST

## 2020-09-30 PROCEDURE — 3609008400 HC SIGMOIDOSCOPY DIAGNOSTIC: Performed by: INTERNAL MEDICINE

## 2020-09-30 PROCEDURE — 7100000001 HC PACU RECOVERY - ADDTL 15 MIN: Performed by: INTERNAL MEDICINE

## 2020-09-30 PROCEDURE — 2709999900 HC NON-CHARGEABLE SUPPLY: Performed by: INTERNAL MEDICINE

## 2020-09-30 RX ORDER — LIDOCAINE HYDROCHLORIDE 20 MG/ML
INJECTION, SOLUTION INFILTRATION; PERINEURAL PRN
Status: DISCONTINUED | OUTPATIENT
Start: 2020-09-30 | End: 2020-09-30 | Stop reason: SDUPTHER

## 2020-09-30 RX ORDER — PROPOFOL 10 MG/ML
INJECTION, EMULSION INTRAVENOUS CONTINUOUS PRN
Status: DISCONTINUED | OUTPATIENT
Start: 2020-09-30 | End: 2020-09-30 | Stop reason: SDUPTHER

## 2020-09-30 RX ORDER — SODIUM CHLORIDE 9 MG/ML
INJECTION, SOLUTION INTRAVENOUS CONTINUOUS PRN
Status: DISCONTINUED | OUTPATIENT
Start: 2020-09-30 | End: 2020-09-30 | Stop reason: SDUPTHER

## 2020-09-30 RX ORDER — PROPOFOL 10 MG/ML
INJECTION, EMULSION INTRAVENOUS PRN
Status: DISCONTINUED | OUTPATIENT
Start: 2020-09-30 | End: 2020-09-30 | Stop reason: SDUPTHER

## 2020-09-30 RX ADMIN — PHENYLEPHRINE HYDROCHLORIDE 100 MCG: 10 INJECTION INTRAVENOUS at 11:23

## 2020-09-30 RX ADMIN — PROPOFOL 80 MG: 10 INJECTION, EMULSION INTRAVENOUS at 11:10

## 2020-09-30 RX ADMIN — Medication 10 ML: at 09:42

## 2020-09-30 RX ADMIN — PROPOFOL 140 MCG/KG/MIN: 10 INJECTION, EMULSION INTRAVENOUS at 11:11

## 2020-09-30 RX ADMIN — SODIUM CHLORIDE: 9 INJECTION, SOLUTION INTRAVENOUS at 04:48

## 2020-09-30 RX ADMIN — SODIUM CHLORIDE: 9 INJECTION, SOLUTION INTRAVENOUS at 11:10

## 2020-09-30 RX ADMIN — LIDOCAINE HYDROCHLORIDE 100 MG: 20 INJECTION, SOLUTION INFILTRATION; PERINEURAL at 11:10

## 2020-09-30 RX ADMIN — PROPOFOL 30 MG: 10 INJECTION, EMULSION INTRAVENOUS at 11:33

## 2020-09-30 RX ADMIN — Medication 10 ML: at 21:17

## 2020-09-30 ASSESSMENT — PULMONARY FUNCTION TESTS
PIF_VALUE: 1

## 2020-09-30 ASSESSMENT — PAIN SCALES - GENERAL
PAINLEVEL_OUTOF10: 0

## 2020-09-30 NOTE — PROGRESS NOTES
Received from endo - Sedated asleep,nasal cannula,left side supine,abdomon flat and soft,systolic less 80 IVF open infusing without difficulty,continuous monitoring.

## 2020-09-30 NOTE — OP NOTE
Operative Note      Patient: Mare Howard  YOB: 1995  MRN: [id number]    Date of Procedure: 9/30/2020    Pre-Op Diagnosis: GI bleed         Procedure(s):  EGD DIAGNOSTIC ONLY  SIGMOIDOSCOPY DIAGNOSTIC FLEXIBLE    Surgeon(s):  Yahaira Hubbard MD    Anesthesia: Monitor Anesthesia Care    Estimated Blood Loss (mL): Minimal    Complications: None    Findings:   EGD: normal     Flexible sigmoidoscopy: Anterior anal fissure, likely cause of rectal bleeding. He also has rectal erosions (? CMV/HSV) and fleshy anal lesion. These were previously biopsied 9/28 (results pending). Small hemorrhoids     Plans  Await biopsies. NTG cream apply intra-anally BID and around BMs for 3-6 weeks.  He needs to keep stools soft with OTC Benefiber or Metamucil        Electronically signed by Yahaira Hubbard MD on 9/30/2020 at 11:49 AM

## 2020-09-30 NOTE — PROGRESS NOTES
[facility]    9/30/2020 9:45 AM        Name: Chantal Uribe . Admitted: 9/27/2020  Primary Care Provider: Referring Not In System (Inactive) (Tel: None)            Subjective:    Stable overnight no further bloody bowel movement per patient plan for EGD colonoscopy today    Reviewed interval ancillary notes    Current Medications  bictegravir-emtricitab-tenofovir alafenamide (BIKTARVY) -25 MG per tablet 1 tablet (PATIENT SUPPLIED), Daily  PEG-KCl-NaCl-NaSulf-Na Asc-C (MOVIPREP) solution 100 g, Once  sodium chloride flush 0.9 % injection 10 mL, 2 times per day  sodium chloride flush 0.9 % injection 10 mL, PRN  acetaminophen (TYLENOL) tablet 650 mg, Q6H PRN    Or  acetaminophen (TYLENOL) suppository 650 mg, Q6H PRN  polyethylene glycol (GLYCOLAX) packet 17 g, Daily PRN  promethazine (PHENERGAN) tablet 12.5 mg, Q6H PRN    Or  ondansetron (ZOFRAN) injection 4 mg, Q6H PRN  0.9 % sodium chloride infusion, Continuous  potassium chloride (KLOR-CON M) extended release tablet 40 mEq, PRN    Or  potassium bicarb-citric acid (EFFER-K) effervescent tablet 40 mEq, PRN    Or  potassium chloride 10 mEq/100 mL IVPB (Peripheral Line), PRN  magnesium sulfate 2 g in 50 mL IVPB premix, PRN  morphine (PF) injection 2 mg, Q4H PRN        Objective:  BP (!) 92/41   Pulse 76   Temp 97.4 °F (36.3 °C) (Temporal)   Resp 16   Ht 5' 3\" (1.6 m)   Wt 139 lb 8.8 oz (63.3 kg)   SpO2 99%   BMI 24.72 kg/m²     Intake/Output Summary (Last 24 hours) at 9/30/2020 0945  Last data filed at 9/30/2020 0600  Gross per 24 hour   Intake 2848.46 ml   Output 2950 ml   Net -101.54 ml      Wt Readings from Last 3 Encounters:   09/30/20 139 lb 8.8 oz (63.3 kg)   05/10/20 127 lb (57.6 kg)   12/16/16 143 lb 4.8 oz (65 kg)       General appearance:  Appears comfortable.  AAOx3  HEENT: atraumatic, Pupils equal, muscous membranes moist, no masses appreciated  Cardiovascular: Regular rate and rhythm no murmurs appreciated  Respiratory: CTAB no wheezing  Gastrointestinal: Abdomen soft, nbs+ LLQ tenderness to palpation no guarding   EXT: no edema  Neurology: no gross focal deficts  Psychiatry: Appropriate affect. Not agitated  Skin: Warm, dry, no rashes appreciated    Labs and Tests:  CBC:   Recent Labs     09/27/20 2034 09/28/20  0844  09/29/20  1308 09/29/20  1959 09/30/20  0452   WBC 8.1  --  6.1  --   --   --  4.8   HGB 8.1*   < > 9.0*   < > 9.3* 9.1* 9.2*     --  226  --   --   --  190    < > = values in this interval not displayed. BMP:    Recent Labs     09/28/20 0844 09/29/20  0251 09/30/20  0452    137 138   K 4.0 3.9 4.3    109 109   CO2 26 23 27   BUN 13 8 4*   CREATININE 1.0 0.9 0.8*   GLUCOSE 81 100* 97     Hepatic:   Recent Labs     09/27/20 2034 09/28/20  0844   AST 19 20   ALT 14 13   BILITOT 0.4 0.6   ALKPHOS 74 71     CT ABDOMEN PELVIS W IV CONTRAST Additional Contrast? None   Final Result   1. Liquid stool within the distal sigmoid colon and rectum suggestive of   nonspecific diarrheal disease. Mild gaseous dilation of the upstream sigmoid   colon. 2. Moderate to large stool load in the ascending and transverse colon which   could suggest a component of constipation. Recommend correlation with   patient's symptoms. 3. No perianal inflammatory changes or focal collections. Recent imaging reviewed    Problem List  Active Problems:    GI bleed  Resolved Problems:    * No resolved hospital problems. *       Assessment & Plan:   Acute blood loss anemia secondary to likely lower GI bleed.   Status post 4 units PRBCs , colonscopy with distal rectal erythema/erosions and anal lesion + hemmorhoids  - biopsies pending  - [facility]hr [facility]  - plan for egd colonscopy if any further bleed tagged rbc scan  H/o of vanessa resume home meds      Diet: Diet NPO, After Midnight  Code:Full

## 2020-09-30 NOTE — PROGRESS NOTES
Pt returned to ICU room 3916 from endoscopy unit S/P EGD and Colonoscopy. Pt alert and oriented. Vitals stable. Monitor with NSR. General diet ordered. Pt denies needs at this time. Call light reinforced for assistance.

## 2020-09-30 NOTE — PROGRESS NOTES
20G PIV started in pts R AC for procedure. Existing IV functional, however slightly leaking at insertion site when flushed.

## 2020-09-30 NOTE — PROGRESS NOTES
Pt assessment complete, see flowsheets. Medications given, see MAR. Pt alert and oriented x4 and following commands. Pt NSR on monitor. Pt on room air. Pt in isolation for Cdiff r/o. Pt on clear liquid diet and to be NPO @0000. Pt able to use bedside urinal without complications. Pt has RAC peripheral IV access, see flowsheets. Pt has NS gtt infusing, see MAR. Pt able to turn self in bed. Bed in lowest position and alarm on. Call light within reach. Will continue to monitor. Pt very anxious and not wanting to drink prep for scope tomorrow. Spoke with pt at great length the importance of drinking the prep and offered options to help ease having to drink the prep. Pt states he is trying and will drink as much as he can. Will continue to monitor.

## 2020-09-30 NOTE — PLAN OF CARE
Problem: Falls - Risk of:  Goal: Will remain free from falls  Description: Will remain free from falls  Outcome: Ongoing   All fall risk precautions in place. Bed in lowest position and alarm on. Call light within reach. Problem: Pain:  Goal: Pain level will decrease  Description: Pain level will decrease  Outcome: Ongoing   Pt has q2 pain assessment with repositioning.

## 2020-09-30 NOTE — PROGRESS NOTES
Went into pt room to check on status of how much prep pt has drank and pt stating he does not want to drink it. Discussed with pt on importance of drinking prep but pt refusing. Pt has not finished prep from earlier today and still has another bottle to drink. Call made to Dr. Hawk [facility] office regarding pt refusing prep. Awaiting on call back. Will continue to monitor.

## 2020-09-30 NOTE — PROGRESS NOTES
Spoke with on call MD PRABHAKAR informed and no new orders. Spoke with pt at great length about his options and explained the risks he would be taking with not receiving treatment and pt states, \"Let me think on it. \" Instructed pt to call me back in when pt has come to a decision. Will continue to monitor.

## 2020-09-30 NOTE — PROGRESS NOTES
Reassessment complete, see flowsheets. Pt remains alert and oriented x4 and following commands. Pt remains on room air. Pt NS gtt remains infusing, see MAR. Pt able to turn self in bed. Bed in lowest position and alarm on. Call light within reach. Pt visitor at bedside. Will continue to monitor.

## 2020-09-30 NOTE — PROGRESS NOTES
at bedside patient asleep,new floor orders placed per Dr. Adali Copeland criteria to transfer to ICU 16 ,ICU notified.

## 2020-09-30 NOTE — CARE COORDINATION
Discharge planning note:    Chart reviewed for discharge planning. Barrier(s) to discharge-   EGD & Sigmoidoscopy today    Tentative discharge plan-    Home w/spouse    Tentative discharge date- Possibly  Early discharge for tomorrow    Case management will continue to follow progress and update discharge plan as needed.       Carlos Carpenter RN BSN  Case Management  [phone]

## 2020-09-30 NOTE — PROGRESS NOTES
Scope verified using 2 person system. Reviewed pt problem list, history, H&P and assessment preoperatively.

## 2020-09-30 NOTE — ANESTHESIA PRE PROCEDURE
Department of Anesthesiology  Preprocedure Note       Name:  Imelda Resendiz   Age:  31 y.o.  :  1995                                          MRN:  [id number]         Date:  2020      Surgeon: Ra Bennett):  Elder Hyde MD    Procedure: Procedure(s):  EGD DIAGNOSTIC ONLY  COLONOSCOPY DIAGNOSTIC    Medications prior to admission:   Prior to Admission medications    Medication Sig Start Date End Date Taking? Authorizing Provider   Bictegravir-Emtricitab-Tenofov (BIKTARVY PO) Take by mouth    Historical Provider, MD   citalopram (CELEXA) 20 MG tablet Take 20 mg by mouth daily    Historical Provider, MD       Current medications:    No current facility-administered medications for this visit. No current outpatient medications on file.      Facility-Administered Medications Ordered in Other Visits   Medication Dose Route Frequency Provider Last Rate Last Dose    bictegravir-emtricitab-tenofovir alafenamide (BIKTARVY) -25 MG per tablet 1 tablet (PATIENT SUPPLIED)  1 tablet Oral Daily Ela Cheema MD        PEG-KCl-NaCl-NaSulf-Na Asc-C (MOVIPREP) solution 100 g  195 g Oral Once [facility]        sodium chloride flush 0.9 % injection 10 mL  10 mL Intravenous 2 times per day Yamini Archer MD   10 mL at 20 0942    sodium chloride flush 0.9 % injection 10 mL  10 mL Intravenous PRN Yamini Archer MD        acetaminophen (TYLENOL) tablet 650 mg  650 mg Oral Q6H PRN Yamini Archer MD        Or    acetaminophen (TYLENOL) suppository 650 mg  650 mg Rectal Q6H PRN Hardik Delvalle MD        polyethylene glycol (GLYCOLAX) packet 17 g  17 g Oral Daily PRN Yamini Acrher MD        promethazine (PHENERGAN) tablet 12.5 mg  12.5 mg Oral Q6H PRN Hardik Delvalle MD        Or    ondansetron (ZOFRAN) injection 4 mg  4 mg Intravenous Q6H PRN Hardik Delvalle MD        0.9 % sodium chloride infusion   Intravenous Continuous Hardik Delvalle  mL/hr at 09/30/20 0448      potassium chloride (KLOR-CON M) extended release tablet 40 mEq  40 mEq Oral PRN Harmony Chahal MD        Or    potassium bicarb-citric acid (EFFER-K) effervescent tablet 40 mEq  40 mEq Oral PRN Harmony Chahal MD        Or    potassium chloride 10 mEq/100 mL IVPB (Peripheral Line)  10 mEq Intravenous PRN Harmony Chahal MD        magnesium sulfate 2 g in 50 mL IVPB premix  2 g Intravenous PRN Harmony Chahal MD        morphine (PF) injection 2 mg  2 mg Intravenous Q4H PRN Harmony Chahal MD   2 mg at 09/28/20 0831       Allergies: Allergies   Allergen Reactions    Singulair [Montelukast Sodium] Other (See Comments)     headache       Problem List:    Patient Active Problem List   Diagnosis Code    GI bleed K92.2       Past Medical History:        Diagnosis Date    Asthma     HIV (human immunodeficiency virus infection) ([facility] [address]        Past Surgical History:        Procedure Laterality Date    SIGMOIDOSCOPY N/A 9/28/2020    SIGMOIDOSCOPY BIOPSY FLEXIBLE performed by Laura Dior MD at [address] [facility] ENDOSCOPY       Social History:    Social History     Tobacco Use    Smoking status: Current Every Day Smoker     Packs/day: 0.50     Types: Cigarettes   Substance Use Topics    Alcohol use: Yes     Comment: once weekly                                Ready to quit: Not Answered  Counseling given: Not Answered      Vital Signs (Current): There were no vitals filed for this visit.                                            BP Readings from Last 3 Encounters:   09/30/20 120/68   09/28/20 118/73   05/10/20 138/81       NPO Status:                                                                                 BMI:   Wt Readings from Last 3 Encounters:   09/30/20 139 lb 8.8 oz (63.3 kg)   05/10/20 127 lb (57.6 kg)   12/16/16 143 lb 4.8 oz (65 kg)     There is no height or weight on file to calculate BMI.    CBC:   Lab Results   Component Value Date    WBC 4.8 09/30/2020    RBC 3.06 09/30/2020    HGB 9.2 09/30/2020    HCT 26.8 09/30/2020    MCV 87.6 09/30/2020    RDW 15.5 09/30/2020     09/30/2020       CMP:   Lab Results   Component Value Date     09/30/2020    K 4.3 09/30/2020     09/30/2020    CO2 27 09/30/2020    BUN 4 09/30/2020    CREATININE 0.8 09/30/2020    GFRAA >60 09/30/2020    AGRATIO 1.5 09/28/2020    LABGLOM >60 09/30/2020    GLUCOSE 97 09/30/2020    PROT 5.7 09/28/2020    CALCIUM 7.7 09/30/2020    BILITOT 0.6 09/28/2020    ALKPHOS 71 09/28/2020    AST 20 09/28/2020    ALT 13 09/28/2020       POC Tests: No results for input(s): POCGLU, POCNA, POCK, POCCL, POCBUN, POCHEMO, POCHCT in the last 72 hours. Coags:   Lab Results   Component Value Date    PROTIME 13.6 09/29/2020    INR 1.17 09/29/2020    APTT 29.8 09/29/2020       HCG (If Applicable): No results found for: PREGTESTUR, PREGSERUM, HCG, HCGQUANT     ABGs: No results found for: PHART, PO2ART, HKM2GBR, MFH0DZB, BEART, N7PNONIS     Type & Screen (If Applicable):  No results found for: LABABO, LABRH    Drug/Infectious Status (If Applicable):  No results found for: HIV, HEPCAB    COVID-19 Screening (If Applicable):   Lab Results   Component Value Date    COVID19 Not Detected 05/10/2020         Anesthesia Evaluation  Patient summary reviewed and Nursing notes reviewed  Airway: Mallampati: II        Dental:          Pulmonary:normal exam    (+) asthma:                            Cardiovascular:  Exercise tolerance: good (>4 METS),           Rhythm: regular                      Neuro/Psych:   Negative Neuro/Psych ROS              GI/Hepatic/Renal:            ROS comment: GIB. Endo/Other:    (+) blood dyscrasia (blood transfusions): anemia:., .                  ROS comment: HIV + Abdominal:           Vascular: negative vascular ROS. Anesthesia Plan      MAC     ASA 3       Induction: intravenous. Anesthetic plan and risks discussed with patient.       Plan discussed with CRNA.                   Socorro Iqbal MD   9/30/2020

## 2020-09-30 NOTE — ANESTHESIA POSTPROCEDURE EVALUATION
Department of Anesthesiology  Postprocedure Note    Patient: Imelda Resendiz  MRN: [id number]  YOB: 1995  Date of evaluation: 9/30/2020  Time:  12:29 PM     Procedure Summary     Date:  09/30/20 Room / Location:  [address] [place] [address]    Anesthesia Start:  1109 Anesthesia Stop:  0369    Procedures:       EGD DIAGNOSTIC ONLY (N/A Abdomen)      SIGMOIDOSCOPY DIAGNOSTIC FLEXIBLE (N/A ) Diagnosis:  (GI bleed)    Surgeon:  Elder Hyde MD Responsible Provider:  Elo Dawkins MD    Anesthesia Type:  MAC ASA Status:  3          Anesthesia Type: MAC    Danyel Phase I: Danyel Score: 9    Danyel Phase II:      Last vitals: Reviewed and per EMR flowsheets.        Anesthesia Post Evaluation    Level of consciousness: awake  Complications: no

## 2020-09-30 NOTE — PROGRESS NOTES
Reassessment complete, see flowsheets. Pt remains alert and oriented x4 and following commands. Pt remains on room air. Pt does not want prep for scope tomorrow, GI aware, will need to reevaluate in the morning. Pt NS gtt infusing, see MAR. Pt able to turn self in bed. Bed in lowest position and alarm on. Call light within reach. Pt visitor at bedside. Will continue to monitor.

## 2020-10-01 VITALS
TEMPERATURE: 98.9 F | DIASTOLIC BLOOD PRESSURE: 54 MMHG | RESPIRATION RATE: 15 BRPM | WEIGHT: 139.55 LBS | HEIGHT: 63 IN | BODY MASS INDEX: 24.73 KG/M2 | HEART RATE: 74 BPM | SYSTOLIC BLOOD PRESSURE: 108 MMHG | OXYGEN SATURATION: 99 %

## 2020-10-01 LAB
ANION GAP SERPL CALCULATED.3IONS-SCNC: 4 MMOL/L (ref 3–16)
BUN BLDV-MCNC: 7 MG/DL (ref 7–20)
CALCIUM SERPL-MCNC: 7.8 MG/DL (ref 8.3–10.6)
CHLORIDE BLD-SCNC: 108 MMOL/L (ref 99–110)
CO2: 28 MMOL/L (ref 21–32)
CREAT SERPL-MCNC: 0.8 MG/DL (ref 0.9–1.3)
GFR AFRICAN AMERICAN: >60
GFR NON-AFRICAN AMERICAN: >60
GLUCOSE BLD-MCNC: 117 MG/DL (ref 70–99)
HCT VFR BLD CALC: 26.6 % (ref 40.5–52.5)
HEMOGLOBIN: 9.1 G/DL (ref 13.5–17.5)
POTASSIUM REFLEX MAGNESIUM: 3.8 MMOL/L (ref 3.5–5.1)
SODIUM BLD-SCNC: 140 MMOL/L (ref 136–145)

## 2020-10-01 PROCEDURE — 85014 HEMATOCRIT: CPT

## 2020-10-01 PROCEDURE — 36415 COLL VENOUS BLD VENIPUNCTURE: CPT

## 2020-10-01 PROCEDURE — 80048 BASIC METABOLIC PNL TOTAL CA: CPT

## 2020-10-01 PROCEDURE — 2580000003 HC RX 258: Performed by: INTERNAL MEDICINE

## 2020-10-01 PROCEDURE — 85018 HEMOGLOBIN: CPT

## 2020-10-01 RX ORDER — POLYETHYLENE GLYCOL 3350 17 G/17G
17 POWDER, FOR SOLUTION ORAL DAILY
Qty: 510 G | Refills: 0 | Status: SHIPPED | OUTPATIENT
Start: 2020-10-01 | End: 2020-10-31

## 2020-10-01 RX ADMIN — SODIUM CHLORIDE: 9 INJECTION, SOLUTION INTRAVENOUS at 02:09

## 2020-10-01 RX ADMIN — Medication 10 ML: at 08:35

## 2020-10-01 ASSESSMENT — PAIN SCALES - GENERAL
PAINLEVEL_OUTOF10: 0

## 2020-10-01 NOTE — DISCHARGE SUMMARY
Hospital Medicine Discharge Summary    Patient: Adalberto Moore     Gender: male  : 1995   Age: 31 y.o. MRN: [id number]    Admitting Physician: Juan Flores MD  Discharge Physician: Jillian Dakins, MD     Code Status: Full Code     Admit Date: 2020   Discharge Date:   10/1/20    Disposition:  Home  Time spent arranging discharge: 35 minutes    Discharge Diagnoses: Active Hospital Problems    Diagnosis Date Noted    GI bleed [K92.2] 2020   acute blood loss anemia secondary to lower gi bleed        Condition at Discharge:  Stable    Hospital Course:   Acute blood loss anemia secondary to likely lower GI bleed. 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 AAOx3  HEENT: atraumatic, Pupils equal, muscous membranes moist, no masses appreciated  Cardiovascular: Regular rate and rhythm no murmurs appreciated  Respiratory: CTAB no wheezing  Gastrointestinal: Abdomen soft, non-tender, BS+  EXT: no edema  Neurology: no gross focal deficts  Psychiatry: Appropriate affect.  Not agitated  Skin: Warm, dry, no rashes appreciated    Discharge Medications:   Current Discharge Medication List      START taking these medications    Details   polyethylene glycol (GLYCOLAX) 17 GM/SCOOP powder Take 17 g by mouth daily  Qty: 510 g, Refills: 0           Current Discharge Medication List        Current Discharge Medication List      CONTINUE these medications which have NOT CHANGED    Details   Bictegravir-Emtricitab-Tenofov (BIKTARVY PO) Take by mouth      citalopram (CELEXA) 20 MG tablet Take 20 mg by mouth daily           Current Discharge Medication List      STOP taking these medications       STRIBILD [phone] MG tablet Comments:   Reason for Stopping:               Labs: For convenience and continuity at follow-up the following most recent labs are provided:    Lab Results   Component Value Date    WBC 4.8 09/30/2020    HGB 9.1 10/01/2020    HCT 26.6 10/01/2020    MCV 87.6 09/30/2020     09/30/2020     10/01/2020    K 3.8 10/01/2020     10/01/2020    CO2 28 10/01/2020    BUN 7 10/01/2020    CREATININE 0.8 10/01/2020    CALCIUM 7.8 10/01/2020    ALKPHOS 71 09/28/2020    ALT 13 09/28/2020    AST 20 09/28/2020    BILITOT 0.6 09/28/2020    LABALBU 3.4 09/28/2020     Lab Results   Component Value Date    INR 1.17 (H) 09/29/2020       Radiology:  Ct Abdomen Pelvis W Iv Contrast Additional Contrast? None    Result Date: 9/27/2020  EXAMINATION: CT OF THE ABDOMEN AND PELVIS WITH CONTRAST 9/27/2020 9:27 pm TECHNIQUE: CT of the abdomen and pelvis was performed with the administration of intravenous contrast. Multiplanar reformatted images are provided for review. Dose modulation, iterative reconstruction, and/or weight based adjustment of the mA/kV was utilized to reduce the radiation dose to as low as reasonably achievable. COMPARISON: None. HISTORY: ORDERING SYSTEM PROVIDED HISTORY: abd pain, brrb TECHNOLOGIST PROVIDED HISTORY: If patient is on cardiac monitor and/or pulse ox, they may be taken off cardiac monitor and pulse ox, left on O2 if currently on. All monitors reattached when patient returns to room. Additional Contrast?-> none Reason for exam:-> abd pain, brrb Reason for Exam: Abdominal Pain (WITH BRIGHT RED RECTAL BLEEDING FOR 3 DAYS. URGENT CARE TOLD HIM HE HAD A ANAL TEAR.  DENIES VOMITING. + WEAKNESS) Acuity: Acute Type of Exam: Initial FINDINGS: Lower Chest: The visualized lung bases and lower mediastinal structures are unremarkable. Organs: The liver, gallbladder, spleen, pancreas, adrenal glands, and kidneys are normal.  No renal stones or hydronephrosis. GI/Bowel: The small bowel is nondilated. There is moderate to large stool load within the ascending and transverse colon. The sigmoid colon is redundant and mildly dilated proximally with the downstream sigmoid colon normal in caliber but containing liquid stool. No significant wall thickening of the colon. No perianal fat stranding or focal collections. Pelvis: Prostate, seminal vesicles, and urinary bladder are normal. Peritoneum/Retroperitoneum: No significant lymphadenopathy. The abdominal aorta is normal in course and caliber. There is paucity of intra-abdominal fat. Bones/Soft Tissues: No acute soft tissue or osseous abnormality. 1. Liquid stool within the distal sigmoid colon and rectum suggestive of nonspecific diarrheal disease. Mild gaseous dilation of the upstream sigmoid colon. 2. Moderate to large stool load in the ascending and transverse colon which could suggest a component of constipation. Recommend correlation with patient's symptoms. 3. No perianal inflammatory changes or focal collections.          Signed:    Tea Sparks MD   10/1/2020

## 2020-10-01 NOTE — PROGRESS NOTES
Reassessment complete, see flowsheets. Pt remains alert and oriented x4 and following commands. Pt NSR on monitor. Pt remains on room air. Pt NS gtt infusing, see MAR. Pt able to reposition in bed. Bed in lowest position and alarm on. Call light within reach. Pt expressing no other needs or concerns. Will continue to monitor.

## 2020-10-01 NOTE — PROGRESS NOTES
Patient updated on plan of care. Peanut butter and crackers provided with a drink. Assessment completed at this time. Vital signs stable. Patient remains on monitor with audible alarms. Bed in lowest position and call light within reach. Educated on use of call light. Please see the flowsheet. Lines remains patent. Patient states no further needs at this time. Will continue to monitor.

## 2020-10-01 NOTE — PROGRESS NOTES
Pt assessment complete, see flowsheets. Medications given, see MAR. Pt alert and oriented x4 and following commands. Pt NSR on monitor. Pt on room air. Pt on general diet. Pt in contact isolation for Cdiff precautions. Pt able to use bedside urinal without complications. Pt has RAC and R forearm peripheral IV access, see flowsheets. Pt has NS gtt infusing, see MAR. Pt able to reposition in bed. Bed in lowest position and alarm on. Call light within reach. Pt boyfriend at bedside. Pt expressing no other needs or concerns. Will continue to monitor.

## 2020-10-01 NOTE — PROGRESS NOTES
Patient need Rx prior to discharge. Perfect Serve sent to Dr. Deric Seymour... Indu Bragg MD   Patient: Sissy Morrell     10/1/20 10:46 AM   [phone] Hospital or Facility: [facility] From: Nik Dorman RE: Taylor Grace RM: 2030 Dr. Edyta Christine wants NTG ointment 0.2% apply intra-anally BID and around [facility] for 6 weeks.  Need Rx Please

## 2020-10-01 NOTE — PROGRESS NOTES
Pt Discharged in stable condition, VSS, no signs of distress, discharge instructions and meds reviewed. Pt verbalizes understanding and states no further questions or concerns unaddressed. Walked patient out and ride not outside. Jenna Boast had to be called.

## 2020-10-01 NOTE — PROGRESS NOTES
[facility] GI  Gastroenterology Progress Note    Marva Mattson is a 31 y.o. male patient. 1. Gastrointestinal hemorrhage, unspecified gastrointestinal hemorrhage type    2. Anemia, unspecified type    3. Generalized abdominal pain        SUBJECTIVE:    Feels well and eating solid food   No recurrent GI bleed    ROS:  Cardiovascular ROS: no chest pain or dyspnea on exertion  Gastrointestinal ROS: see above  Respiratory ROS: no cough, shortness of breath, or wheezing    Physical    VITALS:  BP (!) 110/53   Pulse 83   Temp 98.8 °F (37.1 °C) (Tympanic)   Resp 16   Ht 5' 3\" (1.6 m)   Wt 139 lb 8.8 oz (63.3 kg)   SpO2 99%   BMI 24.72 kg/m²   TEMPERATURE:  Current - Temp: 98.8 °F (37.1 °C); Max - Temp  Av °F (36.7 °C)  Min: 97 °F (36.1 °C)  Max: 98.8 °F (37.1 °C)    NAD  RRR, Nl s1s2  Lungs CTA Bilaterally, normal effort  Abdomen soft, ND, NT, no HSM, Bowel sounds normal  AAOx3, No asterixis     Data      CBC:   Recent Labs     20  1308 20  19520  045   WBC  --   --  4.8   RBC  --   --  3.06*   HGB 9.3* 9.1* 9.2*   HCT 26.5* 26.6* 26.8*   PLT  --   --  190   MCV  --   --  87.6   MCH  --   --  30.1   MCHC  --   --  34.4   RDW  --   --  15.5*        BMP:  Recent Labs     20  0251 20  0452 10/01/20  0506    138 140   K 3.9 4.3 3.8    109 108   CO2 23 27 28   BUN 8 4* 7   CREATININE 0.9 0.8* 0.8*   CALCIUM 7.5* 7.7* 7.8*   GLUCOSE 100* 97 117*        Hepatic Function Panel:   No results for input(s): AST, ALT, BILIDIR, BILITOT, ALKPHOS in the last 72 hours. No results for input(s): LIPASE, AMYLASE in the last 72 hours. Recent Labs     20  0912   PROTIME 13.6*   INR 1.17*     No results for input(s): PTT in the last 72 hours. No results for input(s): OCCULTBLD in the last 72 hours. Radiology Review:    CT ABDOMEN PELVIS W IV CONTRAST Additional Contrast? None   Final Result   1.  Liquid stool within the distal sigmoid colon and rectum suggestive of nonspecific diarrheal disease. Mild gaseous dilation of the upstream sigmoid   colon. 2. Moderate to large stool load in the ascending and transverse colon which   could suggest a component of constipation. Recommend correlation with   patient's symptoms. 3. No perianal inflammatory changes or focal collections. ASSESSMENT      Hematochezia, Left sided abdominal pain  - began one week PTA. CT with solid stool in the right colon and liquid stool in the rectum. Colonoscopy 9/28 with poor prep, patchy distal rectal erosions, anal lesion, hemorrhoids. EGD 9/30 normal and Flex sig anal fissure (likely cause of bleeding) and anal lesion   Acute blood loss anemia - s/p 2 U pRBCs.  Hb stable now   Anal lesion - prelim bx anal ZEN with HGD       PLAN    - await final path   - OTC miralax daily    - NTG ointment 0.2% apply intra-anally BID and around BM for 6 weeks   - F/up with Dr. Lester Alfonso (colorectal surgery) at [facility] for the anal lesion   - May discharge from GI standpoint     Giuseppe Maynard MD, MSc  [address] [place] [address]

## 2020-10-01 NOTE — PLAN OF CARE
Problem: Falls - Risk of:  Goal: Will remain free from falls  Description: Will remain free from falls  Outcome: Ongoing   All fall risk precautions in place. Bed in lowest position and alarm on. Call light within reach.

## 2020-10-02 LAB
CAMPYLOBACTER JEJUNI/COLI PCR: NOT DETECTED
CAMPYLOBACTER UPSALIENSIS: NOT DETECTED
E COLI SHIGELLA/ENTEROINVASIVE PCR: NOT DETECTED
SALMONELLA PCR: NOT DETECTED
SHIGA TOXIN I: NOT DETECTED
SHIGA TOXIN II: NOT DETECTED

## 2020-10-03 LAB — INTERPRETATION: NEGATIVE

## 2020-10-20 ENCOUNTER — OFFICE VISIT (OUTPATIENT)
Dept: SURGERY | Age: 25
End: 2020-10-20
Payer: MEDICAID

## 2020-10-20 VITALS
HEIGHT: 63 IN | HEART RATE: 94 BPM | SYSTOLIC BLOOD PRESSURE: 119 MMHG | WEIGHT: 129 LBS | DIASTOLIC BLOOD PRESSURE: 76 MMHG | BODY MASS INDEX: 22.86 KG/M2 | TEMPERATURE: 96.8 F | OXYGEN SATURATION: 99 %

## 2020-10-20 PROCEDURE — 4004F PT TOBACCO SCREEN RCVD TLK: CPT | Performed by: SURGERY

## 2020-10-20 PROCEDURE — 1111F DSCHRG MED/CURRENT MED MERGE: CPT | Performed by: SURGERY

## 2020-10-20 PROCEDURE — G8484 FLU IMMUNIZE NO ADMIN: HCPCS | Performed by: SURGERY

## 2020-10-20 PROCEDURE — G8420 CALC BMI NORM PARAMETERS: HCPCS | Performed by: SURGERY

## 2020-10-20 PROCEDURE — G8427 DOCREV CUR MEDS BY ELIG CLIN: HCPCS | Performed by: SURGERY

## 2020-10-20 PROCEDURE — 99203 OFFICE O/P NEW LOW 30 MIN: CPT | Performed by: SURGERY

## 2020-10-20 NOTE — PATIENT INSTRUCTIONS
have to limit fluids, talk with your doctor before you increase the amount of fluids you drink. ¨ Miralax or colace can be helpful to take as needed for constipation. Read and follow all instructions on the label. ¨ Use the toilet when only when you feel the urge. Do not strain when having a bowel movement. Do not sit on the toilet too long, play games on your cell phone, or read while on the commode. ¨ Get some exercise every day. Build up slowly to 30 to 60 minutes a day on 5 or more days of the week. · Support your feet with a small step stool when you sit on the toilet. This helps flex your hips and places your pelvis in a squatting position. · Most over-the-counter ointments and creams are not helpful, and can even cause damage to the skin  · Use baby wipes instead of toilet paper to clean after a bowel movement. These products do not irritate the anus. · Sit in a few inches of warm water (sitz bath) 3 times a day and after bowel movements. The warm water helps ease discomfort. Do not put soaps, salts, or shampoos in the water. Be sure to follow up in the office as instructed  · Typically you will have a follow up appointment scheduled in 4-6 weeks to reassess your symptoms. If not, please call the office to schedule this. · You may need to be seen sooner if you have fever, chills, excessive bleeding, increasing pain, inability to urinate, or changes in appetite. · Please call the office at [phone] with any questions or concerns  · If it is outside of normal hours and you have any concerns, please go to your nearest emergency room. What other options are available to treat fissures if I continue to have symptoms:  · Special ointments can be prescribed to help relax the muscle spasm. Typically, these need to be compounded (mixed) at a special pharmacy. A pea-sized amount should be used around (not inside) the anus twice daily.   · Botox injections of the sphincter can be performed, which will provide spasm relief for 1-2 months. Occasionally this needs to be repeated. This is typically performed as a same day surgery with anesthesia/sedation. · Internal sphincterotomy is a surgery in which a portion of the internal sphincter muscle is cut, to relieve the spasm. This procedure has a high success rate, but a higher risk of complications, including rarely a loss of bowel control (incontinence). This is typically performed as a same day surgery with anesthesia/sedation. · Fissurectomy and dermal advancement flap is a surgery in which healthy skin flaps are brought in from around the anus to cover the fissure and promote healing. This surgery is a bit more complex and sometimes require an overnight stay in the hospital.  · The decision of which treatment is right for you depends on the chronicity and severity of your symptoms, age, gender, previous anorectal and other surgeries, underlying bowel control issues, and any suspicion for cancer or other diseases. · Colonoscopy may be recommended at some point in your care if you have not had one recently or bleeding continues after the fissure heals    · Please talk to your colorectal surgeon about any health conditions or concerns you may have regarding your anal fissure treatment.

## 2020-10-20 NOTE — PROGRESS NOTES
[address] [address] [address]  Dept: [phone]  Dept Fax: [phone]  Loc: [phone]    Visit Date: 10/20/2020    Odalis Douglas is a 31 y.o. male who presents today for: New Patient (Anal fissure)      HPI:       Odalis Douglas is a 31 y.o. male referred by Dr. Alysha Castelan for anorectal discomfort. Patient has had 3-4 weeks of anorectal pain and discomfort. Symptoms occur after BMs. Bleeding: yes  Constipation: yes  Patient has tried: Nitro ointment inconsistently  Previous similar symptoms: no  Previous anorectal surgeries: no    Denies fever, chills, abd pain, nausea, emesis, weight loss. Hx of HIV, unsure of his last CD4 count. Patient's problem list, medications, past medical, surgical, family, and social histories were reviewed and updated in the chart as indicated today. Past Medical History:   Diagnosis Date    Asthma     HIV (human immunodeficiency virus infection) ([facility] [address]        Past Surgical History:   Procedure Laterality Date    SIGMOIDOSCOPY N/A 9/28/2020    SIGMOIDOSCOPY BIOPSY FLEXIBLE performed by Fadumo Barth MD at [facility] N/A 9/30/2020    SIGMOIDOSCOPY DIAGNOSTIC FLEXIBLE performed by Fadumo Barth MD at [address] ENDOSCOPY N/A 9/30/2020    EGD DIAGNOSTIC ONLY performed by Fadumo Barth MD at [address] [facility] ENDOSCOPY       Family History: Family history of colorectal cancer/polyps: no    Social History:   Social History     Tobacco Use    Smoking status: Current Every Day Smoker     Packs/day: 0.50     Types: Cigarettes    Smokeless tobacco: Never Used   Substance Use Topics    Alcohol use: Yes     Comment: once weekly      Tobacco cessation counseling provided as appropriate. REVIEW OF SYSTEMS:    Pertinent positives and negatives are mentioned in the HPI above. Otherwise, all other systems were reviewed and negative.       Objective: Physical Exam   /76   Pulse 94   Temp 96.8 °F (36 °C) (Infrared)   Ht 5' 3\" (1.6 m)   Wt 129 lb (58.5 kg)   SpO2 99%   BMI 22.85 kg/m²   Constitutional: Appears well-developed and well-nourished. Grooming appropriate. No gross deformities. Body mass index is 22.85 kg/m². Eyes: No scleral icterus. Conjunctiva/lids normal. Vision intact grossly. Pupils equal/symmetric, reactive bilaterally. ENT: External ears/nose without defect, scars, or masses. Hearing grossly intact. No facial deformity. Lips normal, normal dentition. Neck: No masses. Trachea midline. No crepitus. Thyroid not enlarged. Cardiovascular: Normal rate. No peripheral edema. Abdominal aorta normal size to palpation. Pulmonary/Chest: Effort normal. No respiratory distress. No wheezes. No use of accessory muscles. Musculoskeletal: Normal range of motion x all 4 extremities and head/neck, without deformity, pain, or crepitus, with normal strength and tone. Normal gait. Nails without clubbing or cyanosis. Neurological: Alert and oriented to person, place, and time. No gross deficits. Sensation intact. Skin: Skin is dry. No rashes noted. No pallor. No induration of nodules. Psychiatric: Normal mood and affect. Behavior normal. Oriented to person, place, and time. Judgment and insight reasonable. Abdominal/wound: soft, nontender    RECTAL EXAM:    Chaperone/MA present in room during entire exam. Patient was placed in lateral or knee-chest positioning depending on comfort. Exam table manipulated for proper visualization and lighting. Buttocks spread.      Inspection reveals: Anterior midline fissure confirmed by cotton tip swab palpation    Digital exam and anoscopy deferred due to acute pain      Labs: chem, CBC reviewed 9/30/20  Radiology: none    Last colonoscopy: Flex sig Dr Arias Amaral -anal ulcer versus fissure, biopsies with high-grade precancer squamous tissue      Assessment/Plan:     A/P:  New problem(s): Anal fissure/ulceration  Established problem(s): HIV - unknown CD4 count  Additional workup/treatment planned: Medical therapy with prescription calcium channel blocker ointment, fiber supplementation, sitz baths, improving dietary and lifestyle choices  Risk of complications/morbidity: moderate    Patient has signs and symptoms consistent with anal fissure. Exam reveals anterior midline acute anal fissure. We will start with conservative management, including fiber supplementation, water, healthy fruits and vegetables, and medical management with prescription topical calcium channel blocker ointment. Discussed the use of the prescription ointment and that it will need to be obtained from a compounding pharmacy, which my office will arrange. If symptoms do not improve in 6-8 weeks, patient may require more invasive treatment options, such as Botox injection, partial sphincterotomy, or fissurectomy with anocutaneous advancement flap. We briefly discussed operative risks of these various options. Patient understands the need for full anorectal exam in the future after resolution of symptoms (or colonoscopy depending on other risk factors for colon cancer). I also discussed close follow-up with his infectious disease doctor, as he does not know his last CD4 count. I discussed that occasionally fissures can be confused with anal ulcerations or STDs. Discussed that this will definitely interfere with his ability to heal.    We will address his anal dysplasia in the future after the fissure symptoms have improved and he is able to tolerate a full digital rectal and anoscopy exam in the office. Briefly discussed excision versus watchful waiting.     I provided written information in the After Visit Summary AVS Regarding: Anal fissure    DISPOSITION:  F/u in 6 weeks for symptom reassessment    My findings will be relayed to consulting practitioner or PCP via Epic    Total encounter time:  35 min with > 50% spent with

## 2020-12-01 ENCOUNTER — OFFICE VISIT (OUTPATIENT)
Dept: SURGERY | Age: 25
End: 2020-12-01
Payer: MEDICAID

## 2020-12-01 VITALS
SYSTOLIC BLOOD PRESSURE: 139 MMHG | OXYGEN SATURATION: 99 % | TEMPERATURE: 98.1 F | BODY MASS INDEX: 23.21 KG/M2 | HEIGHT: 63 IN | HEART RATE: 114 BPM | WEIGHT: 131 LBS | DIASTOLIC BLOOD PRESSURE: 83 MMHG

## 2020-12-01 PROCEDURE — 99213 OFFICE O/P EST LOW 20 MIN: CPT | Performed by: SURGERY

## 2020-12-01 PROCEDURE — G8427 DOCREV CUR MEDS BY ELIG CLIN: HCPCS | Performed by: SURGERY

## 2020-12-01 PROCEDURE — 4004F PT TOBACCO SCREEN RCVD TLK: CPT | Performed by: SURGERY

## 2020-12-01 PROCEDURE — G8484 FLU IMMUNIZE NO ADMIN: HCPCS | Performed by: SURGERY

## 2020-12-01 PROCEDURE — G8420 CALC BMI NORM PARAMETERS: HCPCS | Performed by: SURGERY

## 2020-12-01 NOTE — PROGRESS NOTES
[address] [address] [address]  Dept: [phone]  Dept Fax: [phone]  Loc: [phone]    Visit Date: 12/1/2020    Audra Liu is a 31 y.o. male who presents today for: 1 Month Follow-Up (Anal fissure )      HPI:       Audra Liu is a 31 y.o. male known to me for previous visit for anal ulceration/fissure. He also has a known history of HIV. He continues to have pain and bleeding from the ulceration. Unfortunately, he missed his last visit with infectious disease and still does not know his last CD4 count or viral load. He denies fever, chills, nausea, vomiting, food intolerance, weight loss. He continues to smoke cigarettes daily.     Past Medical History:   Diagnosis Date    Asthma     HIV (human immunodeficiency virus infection) ([facility] [address]      Past Surgical History:   Procedure Laterality Date    SIGMOIDOSCOPY N/A 9/28/2020    SIGMOIDOSCOPY BIOPSY FLEXIBLE performed by Vanesa Sky MD at [address] [facility] N/A 9/30/2020    SIGMOIDOSCOPY DIAGNOSTIC FLEXIBLE performed by Vanesa Sky MD at [address] [place] [address] N/A 9/30/2020    EGD DIAGNOSTIC ONLY performed by Vanesa Sky MD at [address] [place] [address] ENDOSCOPY       Current Outpatient Medications:     Bictegravir-Emtricitab-Tenofov (BIKTARVY PO), Take by mouth, Disp: , Rfl:     citalopram (CELEXA) 20 MG tablet, Take 20 mg by mouth daily, Disp: , Rfl:   Allergies   Allergen Reactions    Clotrimazole Other (See Comments)     Burning where applied    Lactase     Singulair [Montelukast Sodium] Other (See Comments)     headache     Past Surgical History:   Procedure Laterality Date    SIGMOIDOSCOPY N/A 9/28/2020    SIGMOIDOSCOPY BIOPSY FLEXIBLE performed [facility]a Sky MD at [facility] 9/30/2020    SIGMOIDOSCOPY DIAGNOSTIC FLEXIBLE performed by Vanesa Sky MD at [address] [facility] GASTROINTESTINAL ENDOSCOPY N/A 9/30/2020    EGD DIAGNOSTIC ONLY performed by Gilson Valle MD at [address] [place] [address]     No family history on file. Social History:   Social History     Tobacco Use    Smoking status: Current Every Day Smoker     Packs/day: 0.50     Types: Cigarettes    Smokeless tobacco: Never Used   Substance Use Topics    Alcohol use: Yes     Comment: once weekly      Tobacco cessation counseling provided as appropriate. REVIEW OF SYSTEMS:    Pertinent positives and negatives are mentioned in the HPI. Otherwise, all other systems were reviewed and negative. Objective:     Physical Exam   /83   Pulse 114   Temp 98.1 °F (36.7 °C) (Infrared)   Ht 5' 3\" (1.6 m)   Wt 131 lb (59.4 kg)   SpO2 99%   BMI 23.21 kg/m²   Constitutional: Appears well-developed and well-nourished. Grooming appropriate. No gross deformities. Body mass index is 23.21 kg/m². Eyes: No scleral icterus. Conjunctiva/lids normal. Vision intact grossly. Pupils equal/symmetric, reactive bilaterally. ENT: External ears/nose without defect, scars, or masses. Hearing grossly intact. No facial deformity. Lips normal, normal dentition. Neck: No masses. Trachea midline. No crepitus. Thyroid not enlarged. Cardiovascular: Normal rate. No peripheral edema. Abdominal aorta normal size to palpation. Pulmonary/Chest: Effort normal. No respiratory distress. No wheezes. No use of accessory muscles. Musculoskeletal: Normal range of motion of head/neck, without deformity, pain, or crepitus, with normal strength and tone. Normal gait. Nails without clubbing or cyanosis. Neurological: Alert and oriented to person, place, and time. No gross deficits. Sensation intact. Skin: Skin is dry. No rashes noted. No pallor. No induration of nodules. Psychiatric: Normal mood and affect. Behavior normal. Oriented to person, place, and time. Judgment and insight reasonable.     Abdominal/wound: soft, nontender    Labs reviewed: none

## 2020-12-28 ENCOUNTER — HOSPITAL ENCOUNTER (EMERGENCY)
Age: 25
Discharge: HOME | End: 2020-12-28
Attending: STUDENT IN AN ORGANIZED HEALTH CARE EDUCATION/TRAINING PROGRAM
Payer: MEDICAID

## 2020-12-28 ENCOUNTER — APPOINTMENT (OUTPATIENT)
Dept: CT IMAGING | Age: 25
End: 2020-12-28
Payer: MEDICAID

## 2020-12-28 VITALS
WEIGHT: 130 LBS | BODY MASS INDEX: 23.04 KG/M2 | RESPIRATION RATE: 16 BRPM | HEIGHT: 63 IN | HEART RATE: 101 BPM | DIASTOLIC BLOOD PRESSURE: 54 MMHG | TEMPERATURE: 99.5 F | SYSTOLIC BLOOD PRESSURE: 106 MMHG | OXYGEN SATURATION: 100 %

## 2020-12-28 LAB
A/G RATIO: 0.8 (ref 1.1–2.2)
ABO/RH: NORMAL
ALBUMIN SERPL-MCNC: 3.9 G/DL (ref 3.4–5)
ALP BLD-CCNC: 116 U/L (ref 40–129)
ALT SERPL-CCNC: 14 U/L (ref 10–40)
ANION GAP SERPL CALCULATED.3IONS-SCNC: 6 MMOL/L (ref 3–16)
ANTIBODY SCREEN: NORMAL
APTT: 31 SEC (ref 24.2–36.2)
AST SERPL-CCNC: 17 U/L (ref 15–37)
BASOPHILS ABSOLUTE: 0 K/UL (ref 0–0.2)
BASOPHILS RELATIVE PERCENT: 0.3 %
BILIRUB SERPL-MCNC: 0.3 MG/DL (ref 0–1)
BUN BLDV-MCNC: 12 MG/DL (ref 7–20)
CALCIUM SERPL-MCNC: 9.3 MG/DL (ref 8.3–10.6)
CHLORIDE BLD-SCNC: 103 MMOL/L (ref 99–110)
CO2: 28 MMOL/L (ref 21–32)
CREAT SERPL-MCNC: 1.1 MG/DL (ref 0.9–1.3)
EOSINOPHILS ABSOLUTE: 0.2 K/UL (ref 0–0.6)
EOSINOPHILS RELATIVE PERCENT: 2.5 %
GFR AFRICAN AMERICAN: >60
GFR NON-AFRICAN AMERICAN: >60
GLOBULIN: 4.6 G/DL
GLUCOSE BLD-MCNC: 97 MG/DL (ref 70–99)
HCT VFR BLD CALC: 36.6 % (ref 40.5–52.5)
HEMOGLOBIN: 11.7 G/DL (ref 13.5–17.5)
INR BLD: 1.15 (ref 0.86–1.14)
LIPASE: 18 U/L (ref 13–60)
LYMPHOCYTES ABSOLUTE: 1.5 K/UL (ref 1–5.1)
LYMPHOCYTES RELATIVE PERCENT: 22 %
MCH RBC QN AUTO: 24 PG (ref 26–34)
MCHC RBC AUTO-ENTMCNC: 32.1 G/DL (ref 31–36)
MCV RBC AUTO: 74.8 FL (ref 80–100)
MONOCYTES ABSOLUTE: 0.6 K/UL (ref 0–1.3)
MONOCYTES RELATIVE PERCENT: 8.3 %
NEUTROPHILS ABSOLUTE: 4.4 K/UL (ref 1.7–7.7)
NEUTROPHILS RELATIVE PERCENT: 66.9 %
PDW BLD-RTO: 17.3 % (ref 12.4–15.4)
PLATELET # BLD: 340 K/UL (ref 135–450)
PMV BLD AUTO: 6.8 FL (ref 5–10.5)
POTASSIUM SERPL-SCNC: 4.2 MMOL/L (ref 3.5–5.1)
PROTHROMBIN TIME: 13.4 SEC (ref 10–13.2)
RBC # BLD: 4.9 M/UL (ref 4.2–5.9)
SODIUM BLD-SCNC: 137 MMOL/L (ref 136–145)
TOTAL PROTEIN: 8.5 G/DL (ref 6.4–8.2)
WBC # BLD: 6.6 K/UL (ref 4–11)

## 2020-12-28 PROCEDURE — 74177 CT ABD & PELVIS W/CONTRAST: CPT

## 2020-12-28 PROCEDURE — 2580000003 HC RX 258: Performed by: PHYSICIAN ASSISTANT

## 2020-12-28 PROCEDURE — 96374 THER/PROPH/DIAG INJ IV PUSH: CPT

## 2020-12-28 PROCEDURE — 85025 COMPLETE CBC W/AUTO DIFF WBC: CPT

## 2020-12-28 PROCEDURE — 80053 COMPREHEN METABOLIC PANEL: CPT

## 2020-12-28 PROCEDURE — 86900 BLOOD TYPING SEROLOGIC ABO: CPT

## 2020-12-28 PROCEDURE — 96375 TX/PRO/DX INJ NEW DRUG ADDON: CPT

## 2020-12-28 PROCEDURE — 86850 RBC ANTIBODY SCREEN: CPT

## 2020-12-28 PROCEDURE — 6360000002 HC RX W HCPCS: Performed by: PHYSICIAN ASSISTANT

## 2020-12-28 PROCEDURE — 86901 BLOOD TYPING SEROLOGIC RH(D): CPT

## 2020-12-28 PROCEDURE — 85730 THROMBOPLASTIN TIME PARTIAL: CPT

## 2020-12-28 PROCEDURE — 99285 EMERGENCY DEPT VISIT HI MDM: CPT

## 2020-12-28 PROCEDURE — 83690 ASSAY OF LIPASE: CPT

## 2020-12-28 PROCEDURE — 6360000004 HC RX CONTRAST MEDICATION: Performed by: PHYSICIAN ASSISTANT

## 2020-12-28 PROCEDURE — 85610 PROTHROMBIN TIME: CPT

## 2020-12-28 PROCEDURE — 36415 COLL VENOUS BLD VENIPUNCTURE: CPT

## 2020-12-28 RX ORDER — ONDANSETRON 2 MG/ML
4 INJECTION INTRAMUSCULAR; INTRAVENOUS ONCE
Status: COMPLETED | OUTPATIENT
Start: 2020-12-28 | End: 2020-12-28

## 2020-12-28 RX ORDER — MULTIVIT-MIN/IRON/FOLIC ACID/K 18-600-40
CAPSULE ORAL
COMMUNITY

## 2020-12-28 RX ORDER — MORPHINE SULFATE 4 MG/ML
4 INJECTION, SOLUTION INTRAMUSCULAR; INTRAVENOUS EVERY 30 MIN PRN
Status: DISCONTINUED | OUTPATIENT
Start: 2020-12-28 | End: 2020-12-28 | Stop reason: HOSPADM

## 2020-12-28 RX ORDER — 0.9 % SODIUM CHLORIDE 0.9 %
1000 INTRAVENOUS SOLUTION INTRAVENOUS ONCE
Status: COMPLETED | OUTPATIENT
Start: 2020-12-28 | End: 2020-12-28

## 2020-12-28 RX ORDER — PRAMOXINE HYDROCHLORIDE 10 MG/G
AEROSOL, FOAM TOPICAL
Qty: 1 CAN | Refills: 0 | Status: SHIPPED | OUTPATIENT
Start: 2020-12-28 | End: 2020-12-30

## 2020-12-28 RX ORDER — OXYCODONE HYDROCHLORIDE AND ACETAMINOPHEN 5; 325 MG/1; MG/1
1 TABLET ORAL EVERY 6 HOURS PRN
Qty: 12 TABLET | Refills: 0 | Status: SHIPPED | OUTPATIENT
Start: 2020-12-28 | End: 2020-12-31

## 2020-12-28 RX ADMIN — ONDANSETRON 4 MG: 2 INJECTION INTRAMUSCULAR; INTRAVENOUS at 10:42

## 2020-12-28 RX ADMIN — SODIUM CHLORIDE 1000 ML: 9 INJECTION, SOLUTION INTRAVENOUS at 10:42

## 2020-12-28 RX ADMIN — MORPHINE SULFATE 4 MG: 4 INJECTION, SOLUTION INTRAMUSCULAR; INTRAVENOUS at 10:42

## 2020-12-28 RX ADMIN — IOPAMIDOL 75 ML: 755 INJECTION, SOLUTION INTRAVENOUS at 12:02

## 2020-12-28 ASSESSMENT — PAIN DESCRIPTION - PAIN TYPE: TYPE: ACUTE PAIN

## 2020-12-28 ASSESSMENT — ENCOUNTER SYMPTOMS
NAUSEA: 0
RHINORRHEA: 0
ANAL BLEEDING: 1
VOMITING: 0
RECTAL PAIN: 1
SHORTNESS OF BREATH: 0
DIARRHEA: 0
ABDOMINAL PAIN: 0
COUGH: 0

## 2020-12-28 ASSESSMENT — PAIN DESCRIPTION - LOCATION
LOCATION: RECTUM
LOCATION: RECTUM

## 2020-12-28 ASSESSMENT — PAIN SCALES - GENERAL
PAINLEVEL_OUTOF10: 7
PAINLEVEL_OUTOF10: 2
PAINLEVEL_OUTOF10: 7

## 2020-12-28 NOTE — ED PROVIDER NOTES
I independently performed a history and physical on Jalil Lewis. All diagnostic, treatment, and disposition decisions were made by myself in conjunction with the advanced practice provider. Briefly, this is a 31 y.o. male here for bright red blood per rectum. Follows with Dr. Esperanza Betancur with colorectal surgery for known high-grade colonic dysplasia. he has hx of sigmoidoscopy in September of this year for bright red blood per rectum. At that time he was treated with nitroglycerin cream for a fissure and hemorrhoids. He was recommended to continue stool softeners. On exam pt is resting comfortably  Cardiac RRR, no murmur  Lungs clear bilaterally, no increased work of breathing  Abdomen soft nontender  : small anal fissure at midline. No active bleeding. No perineal crepitus     CT ABDOMEN PELVIS W IV CONTRAST Additional Contrast? None   Final Result   1. No acute abnormality. Labs Reviewed   CBC WITH AUTO DIFFERENTIAL - Abnormal; Notable for the following components:       Result Value    Hemoglobin 11.7 (*)     Hematocrit 36.6 (*)     MCV 74.8 (*)     MCH 24.0 (*)     RDW 17.3 (*)     All other components within normal limits    Narrative:     Performed at:  [facility] [address], [place], [address]   Phone [phone]   COMPREHENSIVE METABOLIC PANEL - Abnormal; Notable for the following components:     Total Protein 8.5 (*)     Albumin/Globulin Ratio 0.8 (*)     All other components within normal limits    Narrative:     Performed at:  [facility] [address] [facility] [place]   Phone [phone]   PROTIME-INR - Abnormal; Notable for the following components:    Protime 13.4 (*)     INR 1.15 (*)     All other components within normal limits    Narrative:     Performed at:  [facility] [address], [place], [facility] [address]   Phone [phone]   LIPASE    Narrative: Performed at:  [facility]  [address] [facility], [address] [facility]   Phone [phone]   APTT    Narrative:     Performed at:  [facility]  [address] [facility], [address] [facility]   Phone [phone]   URINE RT REFLEX TO CULTURE   TYPE AND SCREEN    Narrative:     Performed at:  [facility]  [address] [facility] [facility]   Phone [phone]     Medications   morphine injection 4 mg (4 mg Intravenous Given 12/28/20 1042)   0.9 % sodium chloride bolus (0 mLs Intravenous Stopped 12/28/20 1302)   ondansetron (ZOFRAN) injection 4 mg (4 mg Intravenous Given 12/28/20 1042)   iopamidol (ISOVUE-370) 76 % injection 75 mL (75 mLs Intravenous Given 12/28/20 1202)         Screenings     Sherita Coma Scale  Eye Opening: Spontaneous  Best Verbal Response: Oriented  Best Motor Response: Obeys commands  Sherita Coma Scale Score: 15           Course and MDM:  Patient is 28-year-old male with history of HIV and known high-grade anal dysplasia presenting to the emergency room for bright red blood per rectum. On arrival he is mildly tachycardic but appears comfortable. He has a nonperitoneal abdominal exam.   exam is showing a small anterior anal fissure at the midline without active bleeding. He has some perineal tenderness without mass or crepitus. With history of immunocompromised status and anal dysplasia we decided to obtain a CT of the abdomen and pelvis to assess for cancerous lesion versus deep space abscess. Neither of these were noted. He was encouraged to continue nitroglycerin ointment for his anal fissure. I will prescribe short course of Percocet for pain control as he does have some discomfort when applying this. He is stable for follow-up with colorectal surgery  this week. He is agreeable to plan.     Patient Referrals:  Jose Monroe MD  [address] [facility]  [address] [address]  [phone]    Schedule an appointment as soon as possible for a visit in 2 days      [facility] Emergency Department  [address] [facility]  [phone]    As needed, If symptoms worsen      Discharge Medications:  New Prescriptions    OXYCODONE-ACETAMINOPHEN (PERCOCET) 5-325 MG PER TABLET    Take 1 tablet by mouth every 6 hours as needed for Pain for up to 3 days. Intended supply: 3 days. Take lowest dose possible to manage pain    PRAMOXINE HCL (PROCTOFOAM) 1 % FOAM    Apply topically as needed for discomfort       FINAL IMPRESSION  1. Anal fissure    2. BRBPR (bright red blood per rectum)        Blood pressure (!) 106/54, pulse 101, temperature 99.5 °F (37.5 °C), temperature source Infrared, resp. rate 16, height 5' 3\" (1.6 m), weight 130 lb (59 kg), SpO2 100 %.      For further details of [address] [facility] emergency [facility] encounter, please see documentation by advanced practice provider       Ganga Caballero MD  12/28/20 4076

## 2020-12-28 NOTE — ED NOTES
Bed: 05  Expected date:   Expected time:   Means of arrival: Walk In  Comments:     Corin Hughes RN  12/28/20 8305

## 2020-12-28 NOTE — ED PROVIDER NOTES
[facility]        Pt Name: Stefan Cardenas  MRN: [id number]  [place] 1995  Date of evaluation: 12/28/2020  Provider: Robert Woodard PA-C  PCP: Referring Not In System (Inactive)     I have seen and evaluated this patient with my supervising physician Artur Juarez MD.    [address] [place] [address]       Chief Complaint   Patient presents with    Rectal Bleeding     PT presents with c/o repeating rectal bleeding. States it even hurts to walk. HISTORY OF PRESENT ILLNESS   (Location, Timing/Onset, Context/Setting, Quality, Duration, Modifying Factors, Severity, Associated Signs and Symptoms)  Note limiting factors. Stefan Cardenas is a 31 y.o. male who presents to the emergency department today for evaluation for rectal bleeding and rectal pain. The patient has a past medical history significant for HIV, he states that his last CD4 count was 33. He is currently on Biktarvy. The patient states that he was seen in September of this year, and he states that he was admitted for a GI bleed. He states that he underwent a sigmoidoscopy by Dr. Ashley Reynolds, and he states that they said that he had a fissure, and he states that he also had precancer. He states that he also saw a colorectal surgeon, Dr. Kaiden Leach, and he states that they are concerned about doing surgery, until his CD4 counts are 20 or less. The patient states that over the past couple of days he has noticed increasing pain, and bleeding, and he states that he has been trying to do sits baths, and applied the cream that was given to him previously for his anal fissure although is not working. He states that his pain is sharp, constant and is rated as a 7/10. His pain is only to his rectum. Patient states that he has noticed some bright red bleeding, and he states that he is also noticed some bleeding when he wipes, and surrounding the stool with a bowel movement.   He denies any diarrhea or constipation. He denies any recent trauma or anal intercourse. The patient has not had any fever or chills. He is not had any cough or congestion. He has no nausea or vomiting. He has no abdominal pain. He has no urinary symptoms. Patient otherwise has no complaints at this time. Nursing Notes were all reviewed and agreed with or any disagreements were addressed in the HPI. REVIEW OF SYSTEMS    (2-9 systems for level 4, 10 or more for level 5)     Review of Systems   Constitutional: Negative for activity change, appetite change, chills and fever. HENT: Negative for congestion and rhinorrhea. Respiratory: Negative for cough and shortness of breath. Cardiovascular: Negative for chest pain. Gastrointestinal: Positive for anal bleeding and rectal pain. Negative for abdominal pain, diarrhea, nausea and vomiting. Genitourinary: Negative for difficulty urinating, dysuria and hematuria. Positives and Pertinent negatives as per HPI. Except as noted above in the ROS, all other systems were reviewed and negative.        PAST MEDICAL HISTORY     Past Medical History:   Diagnosis Date    Asthma     HIV (human immunodeficiency virus infection) ([facility] [address]          SURGICAL HISTORY     Past Surgical History:   Procedure Laterality Date    SIGMOIDOSCOPY N/A 9/28/2020    SIGMOIDOSCOPY BIOPSY FLEXIBLE performed by Enrike Chung MD at [facility] 9/30/2020    SIGMOIDOSCOPY DIAGNOSTIC FLEXIBLE performed by Enrike Chung MD at [place] [address] 9/30/2020    EGD DIAGNOSTIC ONLY performed by Enrike Chung MD at [place] [address]       Previous Medications    BICTEGRAVIR-EMTRICITAB-TENOFOV (BIKTARVY PO)    Take by mouth    CHOLECALCIFEROL (VITAMIN D) 50 MCG (2000 UT) CAPS CAPSULE    Take by mouth    CITALOPRAM (CELEXA) 20 MG TABLET    Take 20 mg by mouth daily         ALLERGIES     Clotrimazole, Lactase, and Singulair [montelukast sodium]    FAMILYHISTORY     History reviewed. No pertinent family history. SOCIAL HISTORY       Social History     Tobacco Use    Smoking status: Current Every Day Smoker     Packs/day: 0.50     Types: Cigarettes    Smokeless tobacco: Never Used   Substance Use Topics    Alcohol use: Yes     Comment: once weekly    Drug use: Yes     Frequency: 3.0 times per week     Types: Marijuana       SCREENINGS    [place]ita Coma Scale  Eye Opening: Spontaneous  Best Verbal Response: Oriented  Best Motor Response: Obeys commands  [place] Coma Scale Score: 15        PHYSICAL EXAM    (up to 7 for level 4, 8 or more for level 5)     ED Triage Vitals [12/28/20 0851]   BP Temp Temp Source Pulse Resp SpO2 Height Weight   108/75 99.5 °F (37.5 °C) Infrared 101 16 100 % 5' 3\" (1.6 m) 130 lb (59 kg)       Physical Exam  Vitals signs and nursing note reviewed. Constitutional:       Appearance: He is well-developed. He is not diaphoretic. HENT:      Head: Normocephalic and atraumatic. Right Ear: External ear normal.      Left Ear: External ear normal.      Nose: Nose normal.   Eyes:      General:         Right eye: No discharge. Left eye: No discharge. Neck:      Musculoskeletal: Normal range of motion and neck supple. Trachea: No tracheal deviation. Cardiovascular:      Rate and Rhythm: Normal rate and regular rhythm. Heart sounds: No murmur. Pulmonary:      Effort: Pulmonary effort is normal. No respiratory distress. Breath sounds: Normal breath sounds. No wheezing or rales. Abdominal:      General: Bowel sounds are normal. There is no distension. Palpations: Abdomen is soft. Tenderness: There is no abdominal tenderness. There is no guarding. Genitourinary:     Comments: At the 6 o'clock position on the rectum, there appears to be a small anal fissure. There is no obvious hemorrhoids.   There appears to be a 2 mm area that is raised, and he does have diffuse tenderness across the perineum. Musculoskeletal: Normal range of motion. Skin:     General: Skin is warm and dry. Neurological:      Mental Status: He is alert and oriented to person, place, and time. Psychiatric:         Behavior: Behavior normal.         DIAGNOSTIC RESULTS   LABS:    Labs Reviewed   CBC WITH AUTO DIFFERENTIAL - Abnormal; Notable for the following components:       Result Value    Hemoglobin 11.7 (*)     Hematocrit 36.6 (*)     MCV 74.8 (*)     MCH 24.0 (*)     RDW 17.3 (*)     All other components within normal limits    Narrative:     Performed at:  [facility] [address]   Phone [phone]   COMPREHENSIVE METABOLIC PANEL - Abnormal; Notable for the following components: Total Protein 8.5 (*)     Albumin/Globulin Ratio 0.8 (*)     All other components within normal limits    Narrative:     Performed at:  [facility] [address]   Phone [phone]   PROTIME-INR - Abnormal; Notable for the following components:    Protime 13.4 (*)     INR 1.15 (*)     All other components within normal limits    Narrative:     Performed at:  [facility] [address]   Phone [phone]   LIPASE    Narrative:     Performed at:  [facility] [address]   Phone [phone]   APTT    Narrative:     Performed at:  [facility] [address] [employer]   Phone [phone]   URINE RT REFLEX TO CULTURE   TYPE AND SCREEN    Narrative:     Performed at:  [facility] [address]   Phone [phone]       All other labs were within normal range or not returned as of this dictation. EKG:  All EKG's are interpreted by the Emergency Department Physician in the absence of a cardiologist.  Please see their note for interpretation of EKG. RADIOLOGY:   Non-plain film images such as CT, Ultrasound and MRI are read by the radiologist. Plain radiographic images are visualized and preliminarily interpreted by the ED Provider with the below findings:        Interpretation per the Radiologist below, if available at the time of this note:    CT ABDOMEN PELVIS W IV CONTRAST Additional Contrast? None   Final Result   1. No acute abnormality. No results found. PROCEDURES   Unless otherwise noted below, none     Procedures    CRITICAL CARE TIME   N/A    CONSULTS:  None      EMERGENCY DEPARTMENT COURSE and DIFFERENTIAL DIAGNOSIS/MDM:   Vitals:    Vitals:    12/28/20 0851 12/28/20 1040 12/28/20 1100 12/28/20 1130   BP: 108/75 (!) 101/46 (!) 100/41 (!) 106/54   Pulse: 101      Resp: 16      Temp: 99.5 °F (37.5 °C)      TempSrc: Infrared      SpO2: 100%  99% 100%   Weight: 130 lb (59 kg)      Height: 5' 3\" (1.6 m)          Patient was given the following medications:  Medications   morphine injection 4 mg (4 mg Intravenous Given 12/28/20 1042)   0.9 % sodium chloride bolus (0 mLs Intravenous Stopped 12/28/20 1302)   ondansetron (ZOFRAN) injection 4 mg (4 mg Intravenous Given 12/28/20 1042)   iopamidol (ISOVUE-370) 76 % injection 75 mL (75 mLs Intravenous Given 12/28/20 1202)           Briefly, this is a 58-year-old male who presents to the emergency department today for evaluation for rectal bleeding and rectal pain. He is HIV positive. Patient states that he has seen GI as well as a colorectal surgeon, and he states that they are waiting until his CD4 count improves before they go any surgical treatment. Patient states over the past couple days he has noticed increasing pain and bleeding and he states that when trying to do the sitz bath and apply the cream without any improvement. On physical exam, his abdomen is benign.   On his rectal exam, the 6 o'clock MEDICATIONS:  Discontinued Medications    No medications on file              (Please note that portions of this note were completed with a voice recognition program.  Efforts were made to edit the dictations but occasionally words are mis-transcribed.)    Julio Arvizu PA-C (electronically signed)            Julio Arvizu PA-C  12/28/20 3749

## 2020-12-30 ENCOUNTER — OFFICE VISIT (OUTPATIENT)
Dept: SURGERY | Age: 25
End: 2020-12-30
Payer: MEDICAID

## 2020-12-30 ENCOUNTER — TELEPHONE (OUTPATIENT)
Dept: SURGERY | Age: 25
End: 2020-12-30

## 2020-12-30 VITALS
HEIGHT: 63 IN | TEMPERATURE: 98 F | SYSTOLIC BLOOD PRESSURE: 138 MMHG | DIASTOLIC BLOOD PRESSURE: 75 MMHG | BODY MASS INDEX: 24.1 KG/M2 | OXYGEN SATURATION: 100 % | HEART RATE: 108 BPM | WEIGHT: 136 LBS

## 2020-12-30 PROCEDURE — G8420 CALC BMI NORM PARAMETERS: HCPCS | Performed by: SURGERY

## 2020-12-30 PROCEDURE — G8427 DOCREV CUR MEDS BY ELIG CLIN: HCPCS | Performed by: SURGERY

## 2020-12-30 PROCEDURE — 99213 OFFICE O/P EST LOW 20 MIN: CPT | Performed by: SURGERY

## 2020-12-30 PROCEDURE — 4004F PT TOBACCO SCREEN RCVD TLK: CPT | Performed by: SURGERY

## 2020-12-30 PROCEDURE — G8484 FLU IMMUNIZE NO ADMIN: HCPCS | Performed by: SURGERY

## 2020-12-30 RX ORDER — SODIUM CHLORIDE 0.9 % (FLUSH) 0.9 %
10 SYRINGE (ML) INJECTION EVERY 12 HOURS SCHEDULED
Status: CANCELLED | OUTPATIENT
Start: 2020-12-30

## 2020-12-30 RX ORDER — SODIUM CHLORIDE 0.9 % (FLUSH) 0.9 %
10 SYRINGE (ML) INJECTION PRN
Status: CANCELLED | OUTPATIENT
Start: 2020-12-30

## 2020-12-30 NOTE — TELEPHONE ENCOUNTER
Requested prior authorization for [address] [facility], [id number], [id number]. Spoke to [employer]. Fax being sent to request clinical information.  Pending authorization # [id number]

## 2020-12-30 NOTE — PROGRESS NOTES
[address] [place] [address] [address]  Dept: [phone]  Dept Fax: [phone]  Loc: [phone]    Visit Date: 12/30/2020    Wendy Cerda is a 31 y.o. male who presents today for: Other (itching and burning )      HPI:       Wendy Cerda is a 31 y.o. male known to me for history of anterior anal fissure/ulcer in setting of poorly controlled HIV. Also with known AIN of anal canal.  Recently had episode of rectal bleeding and pain which brought him to the ER. Comes today to the office with continued pain. Nothing is helping, though he has not been able to fill his calcium channel blocker ointment prescription due to cost.      Past Medical History:   Diagnosis Date    Asthma     HIV (human immunodeficiency virus infection) ([facility] [address]      Past Surgical History:   Procedure Laterality Date    SIGMOIDOSCOPY N/A 9/28/2020    SIGMOIDOSCOPY BIOPSY FLEXIBLE performed by Tray Hendrickson MD at [address] N/A 9/30/2020    [address] [facility] performed by Tray Hendrickson MD at [address] N/A 9/30/2020    EGD DIAGNOSTIC ONLY performed by Tray Hendrickson MD at [address]       Current Outpatient Medications:     Cholecalciferol (VITAMIN D) 50 MCG (2000 UT) CAPS capsule, Take by mouth, Disp: , Rfl:     oxyCODONE-acetaminophen (PERCOCET) 5-325 MG per tablet, Take 1 tablet by mouth every 6 hours as needed for Pain for up to 3 days. Intended supply: 3 days.  Take lowest dose possible to manage pain, Disp: 12 tablet, Rfl: 0    Bictegravir-Emtricitab-Tenofov (BIKTARVY PO), Take by mouth, Disp: , Rfl:     citalopram (CELEXA) 20 MG tablet, Take 20 mg by mouth daily, Disp: , Rfl:   Allergies   Allergen Reactions    Clotrimazole Other (See Comments)     Burning where applied    Lactase     Singulair [Montelukast Sodium] Other (See Comments)     headache     Past Sensation intact. Skin: Skin is dry. No rashes noted. No pallor. No induration of nodules. Psychiatric: Normal mood and affect. Behavior normal. Oriented to person, place, and time. Judgment and insight reasonable. Abdominal/wound: Soft, nontender, nondistended    RECTAL EXAM:    Chaperone/MA present in room during entire exam. Patient was placed in knee-chest position or left side down lateral position depending on preference. Exam table manipulated for proper visualization and lighting. Buttocks spread. Inspection reveals: Ulceration versus fissure versus abscess anterior midline; unable to further characterize due to very severe intense pain    Digital exam and anoscopy deferred due to acute pain    Labs reviewed: HIV labs from OSH  Imaging reviewed: CT scan 12/28/20    Assessment/Plan:       A/P:  New problem: Acute onset anal pain  Established problem(s): HIV, smoker, AIN  Additional workup/treatment planned: EUA, possible abscess drainage versus Botox injection tomorrow  Risk of complications/morbidity: High    Though Mr. Lucho Burnham does have known HIV moderately poorly controlled and a known anal fissure/ulceration, he comes in today with acute worsening of his anal pain. I was unable to even spread his buttocks without him having severe discomfort. We will plan for examination under anesthesia. We discussed the various etiology of anorectal pain. Discussed if he does have an abscess we will plan for drainage. Discussed possible fistula which could require seton placement and drains. Discussed if he does only have an anal fissure, we could try Botox injection. Hesitant to perform any large excision given high risk for nonhealing tissue. He understands all this and agrees to proceed. I had a discussion with the patient regarding the risks, benefits, and alternatives of the procedure.  Risks include, but are not limited to: bleeding, infection, missed lesions, need for additional procedures or operations, thrombosis of external hemorrhoids, urinary retention, pelvic sepsis, and sphincter stretch or damage, which could result in temporary or rarely permanent incontinence to stool or flatus. Anesthesia options discussed. Postoperative pain discussed, and time away from work or usual daily activities discussed. All questions answered to patient's satisfaction. Discussed higher risk of morbidity and mortality due to history of: HIV infection, smoker    Continue with current prescription medications    DISPOSITION:  Plan OR tomorrow    My findings will be relayed to consulting practitioner or PCP via Epic note    Note completed using dictation software, please excuse any errors.     Electronically signed by Doug Pantoja MD on 12/30/2020 at 3:57 PM

## 2020-12-30 NOTE — LETTER
[facility] of [address] [place] [address] Road [phone]  f [phone]    Aleksey Sanchez MD                        SURGERY ORDER   -- Time of order -- 20    4:02 PM    Facility:   [facility] # _________________                                                                                    Scheduled By:____________                  Surgery Date & Time:  20 @ 1:30pm                                     Pt arrival: 11:30am                                                                                    Patient Name:  Compa Douglas     :  1995     PCP:  Referring Not In System (Inactive)      Home Ph:    [phone] (home) [phone] (work)                                                    PROCEDURE:  Exam Under Anesthesia, perirectal abscess drainage versus botox injection anal fissure - 77586, [id number], [id number]    DIAGNOSIS:      ICD-10-CM    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 HIV infection, unspecified symptom status ([facility] [address]  B20    3. Smoker  F17.200    4.  Anal pain  K62.89      Anesthesia: _General    Anesthesia: lines, blocks, TAP/epidural, etc: none  Time Needed:  20 minutes    Pt Position:  prone/tamanna-knife  Bowel Prep: none  Outpatient _x__ Admit ___                Pre-Op to be done by: __N/A___  Cardiac Clearance Done by: ________  Medications to be stopped 5 days before surgery: _________    Additional / Special Orders: Conchis Dove MD

## 2020-12-31 ENCOUNTER — HOSPITAL ENCOUNTER (OUTPATIENT)
Age: 25
Setting detail: OUTPATIENT SURGERY
Discharge: HOME | End: 2020-12-31
Attending: SURGERY | Admitting: SURGERY
Payer: MEDICAID

## 2020-12-31 ENCOUNTER — ANESTHESIA (OUTPATIENT)
Dept: OPERATING ROOM | Age: 25
End: 2020-12-31
Payer: MEDICAID

## 2020-12-31 ENCOUNTER — ANESTHESIA EVENT (OUTPATIENT)
Dept: OPERATING ROOM | Age: 25
End: 2020-12-31
Payer: MEDICAID

## 2020-12-31 ENCOUNTER — TELEPHONE (OUTPATIENT)
Dept: SURGERY | Age: 25
End: 2020-12-31

## 2020-12-31 VITALS
WEIGHT: 136 LBS | DIASTOLIC BLOOD PRESSURE: 78 MMHG | RESPIRATION RATE: 18 BRPM | HEIGHT: 63 IN | SYSTOLIC BLOOD PRESSURE: 118 MMHG | OXYGEN SATURATION: 98 % | HEART RATE: 92 BPM | TEMPERATURE: 97.5 F | BODY MASS INDEX: 24.1 KG/M2

## 2020-12-31 VITALS — SYSTOLIC BLOOD PRESSURE: 100 MMHG | DIASTOLIC BLOOD PRESSURE: 53 MMHG | TEMPERATURE: 99 F | OXYGEN SATURATION: 94 %

## 2020-12-31 DIAGNOSIS — F17.200 SMOKER: ICD-10-CM

## 2020-12-31 DIAGNOSIS — B20 HUMAN IMMUNODEFICIENCY VIRUS (HIV) DISEASE (HCC): ICD-10-CM

## 2020-12-31 DIAGNOSIS — K62.82 ANAL DYSPLASIA: ICD-10-CM

## 2020-12-31 DIAGNOSIS — K62.89: Primary | ICD-10-CM

## 2020-12-31 DIAGNOSIS — K62.89 ANAL PAIN: ICD-10-CM

## 2020-12-31 LAB — SARS-COV-2, NAAT: NOT DETECTED

## 2020-12-31 PROCEDURE — 6360000002 HC RX W HCPCS: Performed by: ANESTHESIOLOGY

## 2020-12-31 PROCEDURE — 2500000003 HC RX 250 WO HCPCS: Performed by: NURSE ANESTHETIST, CERTIFIED REGISTERED

## 2020-12-31 PROCEDURE — 87186 SC STD MICRODIL/AGAR DIL: CPT

## 2020-12-31 PROCEDURE — 87205 SMEAR GRAM STAIN: CPT

## 2020-12-31 PROCEDURE — 3600000003 HC SURGERY LEVEL 3 BASE: Performed by: SURGERY

## 2020-12-31 PROCEDURE — 3700000000 HC ANESTHESIA ATTENDED CARE: Performed by: SURGERY

## 2020-12-31 PROCEDURE — 87206 SMEAR FLUORESCENT/ACID STAI: CPT

## 2020-12-31 PROCEDURE — 87076 CULTURE ANAEROBE IDENT EACH: CPT

## 2020-12-31 PROCEDURE — 7100000010 HC PHASE II RECOVERY - FIRST 15 MIN: Performed by: SURGERY

## 2020-12-31 PROCEDURE — 87077 CULTURE AEROBIC IDENTIFY: CPT

## 2020-12-31 PROCEDURE — 2580000003 HC RX 258: Performed by: NURSE ANESTHETIST, CERTIFIED REGISTERED

## 2020-12-31 PROCEDURE — 6370000000 HC RX 637 (ALT 250 FOR IP): Performed by: ANESTHESIOLOGY

## 2020-12-31 PROCEDURE — 2709999900 HC NON-CHARGEABLE SUPPLY: Performed by: SURGERY

## 2020-12-31 PROCEDURE — U0002 COVID-19 LAB TEST NON-CDC: HCPCS

## 2020-12-31 PROCEDURE — 87075 CULTR BACTERIA EXCEPT BLOOD: CPT

## 2020-12-31 PROCEDURE — 87185 SC STD ENZYME DETCJ PER NZM: CPT

## 2020-12-31 PROCEDURE — 7100000011 HC PHASE II RECOVERY - ADDTL 15 MIN: Performed by: SURGERY

## 2020-12-31 PROCEDURE — C9290 INJ, BUPIVACAINE LIPOSOME: HCPCS | Performed by: SURGERY

## 2020-12-31 PROCEDURE — 87070 CULTURE OTHR SPECIMN AEROBIC: CPT

## 2020-12-31 PROCEDURE — 88342 IMHCHEM/IMCYTCHM 1ST ANTB: CPT

## 2020-12-31 PROCEDURE — 6360000002 HC RX W HCPCS: Performed by: NURSE ANESTHETIST, CERTIFIED REGISTERED

## 2020-12-31 PROCEDURE — 2580000003 HC RX 258: Performed by: SURGERY

## 2020-12-31 PROCEDURE — 6370000000 HC RX 637 (ALT 250 FOR IP): Performed by: SURGERY

## 2020-12-31 PROCEDURE — 7100000001 HC PACU RECOVERY - ADDTL 15 MIN: Performed by: SURGERY

## 2020-12-31 PROCEDURE — 45100 BIOPSY OF RECTUM: CPT | Performed by: SURGERY

## 2020-12-31 PROCEDURE — 6360000002 HC RX W HCPCS: Performed by: SURGERY

## 2020-12-31 PROCEDURE — 3700000001 HC ADD 15 MINUTES (ANESTHESIA): Performed by: SURGERY

## 2020-12-31 PROCEDURE — 87102 FUNGUS ISOLATION CULTURE: CPT

## 2020-12-31 PROCEDURE — 87116 MYCOBACTERIA CULTURE: CPT

## 2020-12-31 PROCEDURE — 3600000013 HC SURGERY LEVEL 3 ADDTL 15MIN: Performed by: SURGERY

## 2020-12-31 PROCEDURE — 46270 REMOVE ANAL FIST SUBQ: CPT | Performed by: SURGERY

## 2020-12-31 PROCEDURE — 87015 SPECIMEN INFECT AGNT CONCNTJ: CPT

## 2020-12-31 PROCEDURE — 88341 IMHCHEM/IMCYTCHM EA ADD ANTB: CPT

## 2020-12-31 PROCEDURE — 7100000000 HC PACU RECOVERY - FIRST 15 MIN: Performed by: SURGERY

## 2020-12-31 PROCEDURE — 88305 TISSUE EXAM BY PATHOLOGIST: CPT

## 2020-12-31 RX ORDER — DIPHENHYDRAMINE HYDROCHLORIDE 50 MG/ML
12.5 INJECTION INTRAMUSCULAR; INTRAVENOUS
Status: DISCONTINUED | OUTPATIENT
Start: 2020-12-31 | End: 2020-12-31 | Stop reason: HOSPADM

## 2020-12-31 RX ORDER — MEPERIDINE HYDROCHLORIDE 25 MG/ML
12.5 INJECTION INTRAMUSCULAR; INTRAVENOUS; SUBCUTANEOUS EVERY 5 MIN PRN
Status: DISCONTINUED | OUTPATIENT
Start: 2020-12-31 | End: 2020-12-31 | Stop reason: HOSPADM

## 2020-12-31 RX ORDER — SODIUM CHLORIDE 0.9 % (FLUSH) 0.9 %
10 SYRINGE (ML) INJECTION PRN
Status: DISCONTINUED | OUTPATIENT
Start: 2020-12-31 | End: 2020-12-31 | Stop reason: HOSPADM

## 2020-12-31 RX ORDER — PROPOFOL 10 MG/ML
INJECTION, EMULSION INTRAVENOUS PRN
Status: DISCONTINUED | OUTPATIENT
Start: 2020-12-31 | End: 2020-12-31 | Stop reason: SDUPTHER

## 2020-12-31 RX ORDER — DOXYCYCLINE HYCLATE 100 MG
100 TABLET ORAL 2 TIMES DAILY
Qty: 10 TABLET | Refills: 0 | Status: SHIPPED | OUTPATIENT
Start: 2020-12-31 | End: 2021-01-07

## 2020-12-31 RX ORDER — 0.9 % SODIUM CHLORIDE 0.9 %
500 INTRAVENOUS SOLUTION INTRAVENOUS
Status: DISCONTINUED | OUTPATIENT
Start: 2020-12-31 | End: 2020-12-31 | Stop reason: HOSPADM

## 2020-12-31 RX ORDER — MAGNESIUM HYDROXIDE 1200 MG/15ML
LIQUID ORAL CONTINUOUS PRN
Status: COMPLETED | OUTPATIENT
Start: 2020-12-31 | End: 2020-12-31

## 2020-12-31 RX ORDER — PROCHLORPERAZINE EDISYLATE 5 MG/ML
5 INJECTION INTRAMUSCULAR; INTRAVENOUS
Status: DISCONTINUED | OUTPATIENT
Start: 2020-12-31 | End: 2020-12-31 | Stop reason: HOSPADM

## 2020-12-31 RX ORDER — FENTANYL CITRATE 50 UG/ML
INJECTION, SOLUTION INTRAMUSCULAR; INTRAVENOUS PRN
Status: DISCONTINUED | OUTPATIENT
Start: 2020-12-31 | End: 2020-12-31 | Stop reason: SDUPTHER

## 2020-12-31 RX ORDER — ONDANSETRON 2 MG/ML
4 INJECTION INTRAMUSCULAR; INTRAVENOUS
Status: DISCONTINUED | OUTPATIENT
Start: 2020-12-31 | End: 2020-12-31 | Stop reason: HOSPADM

## 2020-12-31 RX ORDER — HYDRALAZINE HYDROCHLORIDE 20 MG/ML
5 INJECTION INTRAMUSCULAR; INTRAVENOUS EVERY 10 MIN PRN
Status: DISCONTINUED | OUTPATIENT
Start: 2020-12-31 | End: 2020-12-31 | Stop reason: HOSPADM

## 2020-12-31 RX ORDER — ROCURONIUM BROMIDE 10 MG/ML
INJECTION, SOLUTION INTRAVENOUS PRN
Status: DISCONTINUED | OUTPATIENT
Start: 2020-12-31 | End: 2020-12-31 | Stop reason: SDUPTHER

## 2020-12-31 RX ORDER — SODIUM CHLORIDE, SODIUM LACTATE, POTASSIUM CHLORIDE, CALCIUM CHLORIDE 600; 310; 30; 20 MG/100ML; MG/100ML; MG/100ML; MG/100ML
INJECTION, SOLUTION INTRAVENOUS CONTINUOUS PRN
Status: DISCONTINUED | OUTPATIENT
Start: 2020-12-31 | End: 2020-12-31 | Stop reason: SDUPTHER

## 2020-12-31 RX ORDER — LIDOCAINE HYDROCHLORIDE 20 MG/ML
INJECTION, SOLUTION INTRAVENOUS PRN
Status: DISCONTINUED | OUTPATIENT
Start: 2020-12-31 | End: 2020-12-31 | Stop reason: SDUPTHER

## 2020-12-31 RX ORDER — MIDAZOLAM HYDROCHLORIDE 1 MG/ML
INJECTION INTRAMUSCULAR; INTRAVENOUS PRN
Status: DISCONTINUED | OUTPATIENT
Start: 2020-12-31 | End: 2020-12-31 | Stop reason: SDUPTHER

## 2020-12-31 RX ORDER — SODIUM CHLORIDE 0.9 % (FLUSH) 0.9 %
10 SYRINGE (ML) INJECTION EVERY 12 HOURS SCHEDULED
Status: DISCONTINUED | OUTPATIENT
Start: 2020-12-31 | End: 2020-12-31 | Stop reason: HOSPADM

## 2020-12-31 RX ORDER — HYDROCODONE BITARTRATE AND ACETAMINOPHEN 5; 325 MG/1; MG/1
1 TABLET ORAL
Status: COMPLETED | OUTPATIENT
Start: 2020-12-31 | End: 2020-12-31

## 2020-12-31 RX ORDER — ONDANSETRON 2 MG/ML
INJECTION INTRAMUSCULAR; INTRAVENOUS PRN
Status: DISCONTINUED | OUTPATIENT
Start: 2020-12-31 | End: 2020-12-31 | Stop reason: SDUPTHER

## 2020-12-31 RX ADMIN — LIDOCAINE HYDROCHLORIDE 100 MG: 20 INJECTION, SOLUTION INTRAVENOUS at 13:51

## 2020-12-31 RX ADMIN — HYDROCODONE BITARTRATE AND ACETAMINOPHEN 1 TABLET: 5; 325 TABLET ORAL at 16:19

## 2020-12-31 RX ADMIN — HYDROMORPHONE HYDROCHLORIDE 0.5 MG: 1 INJECTION, SOLUTION INTRAMUSCULAR; INTRAVENOUS; SUBCUTANEOUS at 15:26

## 2020-12-31 RX ADMIN — MIDAZOLAM HYDROCHLORIDE 2 MG: 2 INJECTION, SOLUTION INTRAMUSCULAR; INTRAVENOUS at 13:44

## 2020-12-31 RX ADMIN — FENTANYL CITRATE 50 MCG: 50 INJECTION INTRAMUSCULAR; INTRAVENOUS at 13:53

## 2020-12-31 RX ADMIN — FENTANYL CITRATE 50 MCG: 50 INJECTION INTRAMUSCULAR; INTRAVENOUS at 14:19

## 2020-12-31 RX ADMIN — PROPOFOL 100 MG: 10 INJECTION, EMULSION INTRAVENOUS at 13:52

## 2020-12-31 RX ADMIN — ROCURONIUM BROMIDE 50 MG: 10 INJECTION INTRAVENOUS at 13:53

## 2020-12-31 RX ADMIN — ONDANSETRON 4 MG: 2 INJECTION INTRAMUSCULAR; INTRAVENOUS at 14:19

## 2020-12-31 RX ADMIN — SODIUM CHLORIDE, SODIUM LACTATE, POTASSIUM CHLORIDE, AND CALCIUM CHLORIDE: .6; .31; .03; .02 INJECTION, SOLUTION INTRAVENOUS at 13:10

## 2020-12-31 ASSESSMENT — PULMONARY FUNCTION TESTS
PIF_VALUE: 19
PIF_VALUE: 0
PIF_VALUE: 15
PIF_VALUE: 9
PIF_VALUE: 32
PIF_VALUE: 15
PIF_VALUE: 0
PIF_VALUE: 15
PIF_VALUE: 15
PIF_VALUE: 1
PIF_VALUE: 15
PIF_VALUE: 0
PIF_VALUE: 15
PIF_VALUE: 13
PIF_VALUE: 16
PIF_VALUE: 17
PIF_VALUE: 15
PIF_VALUE: 15
PIF_VALUE: 17
PIF_VALUE: 14
PIF_VALUE: 15
PIF_VALUE: 16
PIF_VALUE: 17
PIF_VALUE: 16
PIF_VALUE: 15
PIF_VALUE: 1
PIF_VALUE: 15
PIF_VALUE: 16
PIF_VALUE: 15
PIF_VALUE: 14
PIF_VALUE: 38
PIF_VALUE: 15
PIF_VALUE: 15
PIF_VALUE: 0
PIF_VALUE: 15
PIF_VALUE: 21
PIF_VALUE: 15
PIF_VALUE: 7
PIF_VALUE: 15
PIF_VALUE: 0
PIF_VALUE: 15
PIF_VALUE: 15
PIF_VALUE: 23
PIF_VALUE: 15
PIF_VALUE: 16
PIF_VALUE: 15
PIF_VALUE: 14

## 2020-12-31 ASSESSMENT — PAIN DESCRIPTION - ONSET: ONSET: GRADUAL

## 2020-12-31 ASSESSMENT — PAIN DESCRIPTION - PROGRESSION: CLINICAL_PROGRESSION: GRADUALLY WORSENING

## 2020-12-31 ASSESSMENT — PAIN DESCRIPTION - DESCRIPTORS
DESCRIPTORS: ACHING
DESCRIPTORS: ACHING;THROBBING

## 2020-12-31 ASSESSMENT — PAIN DESCRIPTION - PAIN TYPE: TYPE: ACUTE PAIN;SURGICAL PAIN

## 2020-12-31 ASSESSMENT — PAIN DESCRIPTION - LOCATION
LOCATION: BUTTOCKS
LOCATION: BUTTOCKS

## 2020-12-31 ASSESSMENT — PAIN - FUNCTIONAL ASSESSMENT: PAIN_FUNCTIONAL_ASSESSMENT: 0-10

## 2020-12-31 NOTE — PROGRESS NOTES
Ambulatory Surgery/Procedure Discharge Note    Vitals:    12/31/20 1610   BP: 118/78   Pulse: 92   Resp: 18   Temp: 97.5 °F (36.4 °C)   SpO2: 98%       In: 228 [P.O.:150; I.V.:78]  Out: 0     Restroom use offered before discharge. Yes    Pain assessment:  level of pain (1-10, 10 severe)  Pain Level: 7   Pt to SDS post exam under anesthesia, perirectal abscess drainage versus botox injection anal fissure. Pt c/o surgical pain 7/10, pt medicated per MAR. Pt denies nausea, pt tolerating PO fluids and crackers well. Discharge instructions given to pt's  and he states understanding of these instructions. Patient discharged to home/self care.  Patient discharged via wheel chair by transporter to waiting family/S.O.       12/31/2020 4:49 PM

## 2020-12-31 NOTE — H&P
Full history and physical exam performed within the last 24 hours. Patient states no interval change since H&P completed.     ERNIE Briones - JAMES 12/31/2020

## 2020-12-31 NOTE — PROGRESS NOTES
PACU Transfer to [facility]    Vitals:    12/31/20 1535   BP: 127/75   Pulse: 82   Resp: 15   Temp: 97.4 °F (36.3 °C)   SpO2: 100%         Intake/Output Summary (Last 24 hours) at 12/31/2020 1537  Last data filed at 12/31/2020 1515  Gross per 24 hour   Intake 228 ml   Output 0 ml   Net 228 ml       Pain assessment:  present - adequately treated  Pain level 5 \"tolerable\"      Patient transferred to care of [facility] RN. Transferred with all belongings and sitz bath for home use to [facility]. Spouse, Doretha Vasquez updated and is waiting in the [facility] for discharge instructions.     12/31/2020 3:37 PM

## 2020-12-31 NOTE — ANESTHESIA POSTPROCEDURE EVALUATION
Department of Anesthesiology  Postprocedure Note    Patient: Marva Mattson  MRN: [id number]  YOB: 1995  Date of evaluation: 12/31/2020  Time:  4:16 PM     Procedure Summary     Date: 12/31/20 Room / Location: [address] [place] [address] [address] [place] [address] / [facility]    Anesthesia Start: 1347 Anesthesia Stop: 1440    Procedure: EXAM UNDER ANESTHESIA, PERIRECTAL ABSCESS DRAINAGE Collection of gonorrhea and chlamydia  culture and rectal biopsy (N/A ) Diagnosis:       Anal dysplasia      Human immunodeficiency virus (HIV) disease ([address]      Anal pain      Smoker      (Anal dysplasia [K62.82] Human immunodeficiency virus (HIV) disease ([address] [B20] Anal pain [K62.89] Smoker [F17.200])    Surgeons: Israel Underwood MD Responsible Provider: Rebel Tillman DO    Anesthesia Type: general ASA Status: 3          Anesthesia Type: general    Danyel Phase I: Danyel Score: 10    Danyel Phase II:      Last vitals: Reviewed and per EMR flowsheets.        Anesthesia Post Evaluation    Patient location during evaluation: PACU  Patient participation: complete - patient participated  Level of consciousness: awake and alert  Pain score: 3  Airway patency: patent  Nausea & Vomiting: no nausea and no vomiting  Complications: no  Cardiovascular status: hemodynamically stable  Respiratory status: acceptable  Hydration status: stable

## 2020-12-31 NOTE — ANESTHESIA PRE PROCEDURE
Department of Anesthesiology  Preprocedure Note       Name:  Compa Douglas   Age:  31 y.o.  :  1995                                          MRN:  [id number]         Date:  2020      Surgeon: Mikey Croft):  Alissa Bennett MD    Procedure: Procedure(s):  EXAM UNDER ANESTHESIA, PERIRECTAL ABSCESS DRAINAGE VERSUS  BOTOX INJECTION ANAL FISSURE    Medications prior to admission:   Prior to Admission medications    Medication Sig Start Date End Date Taking? Authorizing Provider   Cholecalciferol (VITAMIN D) 50 MCG (2000) CAPS capsule Take by mouth   Yes Historical Provider, MD   oxyCODONE-acetaminophen (PERCOCET) 5-325 MG per tablet Take 1 tablet by mouth every 6 hours as needed for Pain for up to 3 days. Intended supply: 3 days. Take lowest dose possible to manage pain 20 Yes Bacilio Villanueva MD   Bictegravir-Emtricitab-Tenofov (BIKTARVY PO) Take by mouth daily    Yes Historical Provider, MD       Current medications:    Current Facility-Administered Medications   Medication Dose Route Frequency Provider Last Rate Last Admin    sodium chloride flush 0.9 % injection 10 mL  10 mL Intravenous 2 times per day Alissa Bennett MD        sodium chloride flush 0.9 % injection 10 mL  10 mL Intravenous PRN Alissa Bennett MD         Facility-Administered Medications Ordered in Other Encounters   Medication Dose Route Frequency Provider Last Rate Last Admin    lactated ringers infusion    Continuous PRN ERNIE Owens - CRNA   New Bag at 20 1310       Allergies:     Allergies   Allergen Reactions    Clotrimazole Other (See Comments)     Burning where applied    Lactase     Singulair [Montelukast Sodium] Other (See Comments)     headache       Problem List:    Patient Active Problem List   Diagnosis Code    GI bleed K92.2       Past Medical History:        Diagnosis Date    Asthma     History of seizures     as a child    HIV (human immunodeficiency virus infection) ([facility] [address] Past Surgical History:        Procedure Laterality Date    COLONOSCOPY      ENDOSCOPY, COLON, DIAGNOSTIC      SIGMOIDOSCOPY N/A 9/28/2020    SIGMOIDOSCOPY BIOPSY FLEXIBLE performed by Adelia Rodriguez MD at [facility] N/A 9/30/2020    SIGMOIDOSCOPY DIAGNOSTIC FLEXIBLE performed by Adelia Rodriguez MD at [address] [facility] N/A 9/30/2020    EGD DIAGNOSTIC ONLY performed by Adelia Rodriguez MD at [address] History:    Social History     Tobacco Use    Smoking status: Current Every Day Smoker     Packs/day: 0.50     Types: Cigarettes    Smokeless tobacco: Never Used   Substance Use Topics    Alcohol use: Yes     Comment: once weekly                                Ready to quit: Not Answered  Counseling given: Not Answered      Vital Signs (Current):   Vitals:    12/31/20 1209   BP: 102/68   Pulse: 92   Resp: 16   Temp: 98.2 °F (36.8 °C)   TempSrc: Oral   SpO2: 100%   Weight: 136 lb (61.7 kg)   Height: 5' 3\" (1.6 m)                                              BP Readings from Last 3 Encounters:   12/31/20 102/68   12/30/20 138/75   12/28/20 (!) 106/54       NPO Status: Time of last liquid consumption: 1600                        Time of last solid consumption: 1600                        Date of last liquid consumption: 12/30/20                        Date of last solid food consumption: 12/30/20    BMI:   Wt Readings from Last 3 Encounters:   12/31/20 136 lb (61.7 kg)   12/30/20 136 lb (61.7 kg)   12/28/20 130 lb (59 kg)     Body mass index is 24.09 kg/m².     CBC:   Lab Results   Component Value Date    WBC 6.6 12/28/2020    RBC 4.90 12/28/2020    HGB 11.7 12/28/2020    HCT 36.6 12/28/2020    MCV 74.8 12/28/2020    RDW 17.3 12/28/2020     12/28/2020       CMP:   Lab Results   Component Value Date     12/28/2020    K 4.2 12/28/2020    K 3.8 10/01/2020     12/28/2020    CO2 28 12/28/2020    BUN 12 12/28/2020    CREATININE 1.1 12/28/2020    GFRAA >60 12/28/2020    AGRATIO 0.8 12/28/2020    LABGLOM >60 12/28/2020    GLUCOSE 97 12/28/2020    PROT 8.5 12/28/2020    CALCIUM 9.3 12/28/2020    BILITOT 0.3 12/28/2020    ALKPHOS 116 12/28/2020    AST 17 12/28/2020    ALT 14 12/28/2020       POC Tests: No results for input(s): POCGLU, POCNA, POCK, POCCL, POCBUN, POCHEMO, POCHCT in the last 72 hours. Coags:   Lab Results   Component Value Date    PROTIME 13.4 12/28/2020    INR 1.15 12/28/2020    APTT 31.0 12/28/2020       HCG (If Applicable): No results found for: PREGTESTUR, PREGSERUM, HCG, HCGQUANT     ABGs: No results found for: PHART, PO2ART, HSE6ZQV, PVH0QZV, BEART, C7NLPUYB     Type & Screen (If Applicable):  No results found for: LABABO, LABRH    Drug/Infectious Status (If Applicable):  No results found for: HIV, HEPCAB    COVID-19 Screening (If Applicable):   Lab Results   Component Value Date    COVID19 Not Detected 12/31/2020    COVID19 Not Detected 05/10/2020         Anesthesia Evaluation  Patient summary reviewed and Nursing notes reviewed no history of anesthetic complications:   Airway: Mallampati: I  TM distance: >3 FB   Neck ROM: full  Mouth opening: > = 3 FB Dental: normal exam         Pulmonary: breath sounds clear to auscultation  (+) asthma: current smoker (1 ppd x 10 yrs )          Patient did not smoke on day of surgery. Cardiovascular:  Exercise tolerance: good (>4 METS),         NYHA Classification: I      Rate: normal           Beta Blocker:  Not on Beta Blocker         Neuro/Psych:   Negative Neuro/Psych ROS              GI/Hepatic/Renal:        (-) GERD       Endo/Other:                      ROS comment: + HIV     Almost undetectable   Abdominal:           Vascular:                                        Anesthesia Plan      general     ASA 3       Induction: intravenous. MIPS: Prophylactic antiemetics administered. Anesthetic plan and risks discussed with patient.       Plan discussed with CRNA.    Attending anesthesiologist reviewed and agrees with Pre Eval content              Latasha Rodríguez DO   12/31/2020

## 2020-12-31 NOTE — PROGRESS NOTES
Patient received from the OR to pACU #14 post EXAM UNDER ANESTHESIA, PERIRECTAL ABSCESS DRAINAGE Collection of gonorrhea and chlamydia culture and rectal biopsy of Dr. Ruchi Conde. Placed on PACU monitoring equipment. Report given per CRNA. Per report, patient was stable during the procedure. On arrival, patient is arouseable, santillan and denies pain. Still sleepy from anesthesia.

## 2020-12-31 NOTE — OP NOTE
OPERATIVE NOTE     Lubna Stover  1995  [id number]    DATE OF PROCEDURE: 12/31/20     PREOPERATIVE DIAGNOSES: Anorectal pain     POSTOPERATIVE DIAGNOSES: Mucopurulent proctitis; large anterior rectal ulceration; subcutaneous anal fistula     PROCEDURE:   1. Rectal biopsy  2. Subcutaneous fistulotomy  3. Collection of culture swabs for GC/CT      SURGEON: Dori Farrell MD     ANESTHESIA: GET. COMPLICATIONS: None. EBL: 5 cc    SPECIMEN: Rectal biopsy for permanent     FINDINGS: Mucopurulent discharge coming from rectum with no discernible perirectal abscess. Large anterior midline ulcer extending from anal verge to 2 cm proximal to dentate line, with oozing noted and cobblestoning of mucosa. INDICATIONS: The patient is a 29-year-old male who has had symptoms of severe anorectal pain. I saw him in the office yesterday and he was unable to tolerate exam. Patient was recommended to undergo examination under anesthesia, possible drainage of abscess, rectal biopsies, possible fistulotomy or other indicated procedures. The risks, benefits, and alternatives of procedure were explained to the patient including risks of bleeding, infection, pelvic sepsis, unexpected findings, missed lesions, urinary retention, anal stenosis, and potential  sphincter damage and dysfunction, either temporary or permanent. Patient understood all of these risks and agreed to  proceed. Typical postoperative course was discussed, including pain and likely need for up to 3 weeks of recovery. PROCEDURE DETAILS:   The patient was brought to the operating theater. The patient was placed in the prone tamanna-knife position after induction of anesthesia. Buttocks were taped apart carefully using tape. The patient's perianal region was prepped and draped in usual sterile fashion using Betadine prep solution. Time-out was performed confirming the patient's identity as well as the operative site.  Antibiotics were not indicated. SCDs were on and functioning. All safety points were followed. Digital rectal exam and anoscopy was performed in all 4 quadrants using lighted anal retractor, noting mucopurulent discharge coming from the rectum. Before going any further cultures were collected for gonorrhea and chlamydia and will be sent to the lab. Anoscopy then showed a large ulcer in the anterior midline extending from the anal verge to about 2 cm proximal to the dentate line. It was quite inflamed with oozing and cobblestoning of the mucosa. There was a small skin bridge noted at the distal aspect consistent with subcutaneous fistula. Fistulotomy was performed using cautery. It contained no sphincter muscle. Additionally, biopsies were obtained from the edges and from the base of the ulceration to rule out malignancy and dysplasia. These are labeled as rectal biopsy. Anoscopy was then performed again, wounds were irrigated, confirming complete hemostasis. Hemostatic gelfoam was then rolled up with saline and lubrication and placed in the anal canal.     10 cc of Expirel anesthetic was injected for perianal nerve block. The patient tolerated the procedure well, was woken up and brought to the PACU in stable condition. All counts were correct x2 at the end of the procedure. No complications. Ty Banks.  Karie Herrera MD    Electronically signed by Philippe Ryan MD on 12/31/2020 at 3:03 PM

## 2020-12-31 NOTE — PROGRESS NOTES
[facility] / [facility] ([facility]) [address] [facility], [address]    Acknowledgment of Informed Consent for Surgical or Medical Procedure and Sedation  I agree to allow doctor(s) Saul and his/her associates or assistants, including residents and/or other qualified medical practitioner to perform the following medical treatment or procedure and to administer or direct the administration of sedation as necessary:  Procedure(s): EXAM UNDER ANESTHESIA, PERIRECTAL ABSCESS DRAINAGE VERSUS BOTOX [address]  My doctor has explained the following regarding the proposed procedure:   the explanation of the procedure   the benefits of the procedure   the potential problems that might occur during recuperation   the risks and side effects of the procedure which could include but are not limited to severe blood loss, infection, stroke or death   the benefits, risks and side effect of alternative procedures including the consequences of declining this procedure or any alternative procedures   the likelihood of achieving satisfactory results. I acknowledge no guarantee or assurance has been made to me regarding the results. I understand that during the course of this treatment/procedure, unforeseen conditions can occur which require an additional or different procedure. I agree to allow my physician or assistants to perform such extension of the original procedure as they may find necessary. I understand that sedation will often result in temporary impairment of memory and fine motor skills and that sedation can occasionally progress to a state of deep sedation or general anesthesia. I understand the risks of anesthesia for surgery include, but are not limited to, sore throat, hoarseness, injury to face, mouth, or teeth; nausea; headache; injury to blood vessels or nerves; death, brain damage, or paralysis.     I understand that if I have a Limitation of Treatment order in effect during my hospitalization, the order may or may not be in effect during this procedure. I give my doctor permission to give me blood or blood products. I understand that there are risks with receiving blood such as hepatitis, AIDS, fever, or allergic reaction. I acknowledge that the risks, benefits, and alternatives of this treatment have been explained to me and that no express or implied warranty has been given by the hospital, any blood bank, or any person or entity as to the blood or blood components transfused. At the discretion of my doctor, I agree to allow observers, equipment/product representatives and allow photographing, and/or televising of the procedure, provided my name or identity is maintained confidentially. I agree the hospital may dispose of or use for scientific or educational purposes any tissue, fluid, or body parts which may be removed.     ________________________________Date________Time______ am/pm  ([place] [facility])  Patient or Signature of Closest Relative or Legal Guardian    ________________________________Date________Time______am/pm      Page 1 of  1  Witness

## 2021-01-02 LAB
BODY FLUID CULTURE, STERILE: ABNORMAL
BODY FLUID CULTURE, STERILE: ABNORMAL
GRAM STAIN RESULT: ABNORMAL
GRAM STAIN RESULT: ABNORMAL
ORGANISM: ABNORMAL
ORGANISM: ABNORMAL

## 2021-01-03 LAB
ANAEROBIC CULTURE: ABNORMAL
ANAEROBIC CULTURE: ABNORMAL
ORGANISM: ABNORMAL
ORGANISM: ABNORMAL

## 2021-01-04 NOTE — TELEPHONE ENCOUNTER
Elective surgery  excision of dermoid cyst 2014  Tubal ligation status Patients voicemail not set up, left message with partner Devaughndinesh Purmajor any [place] paperwork that needs to be filled out just drop it off or fax.

## 2021-01-04 NOTE — TELEPHONE ENCOUNTER
Patient would like a call back regarding his FMLA paperwork    Please contact the patient at [phone]

## 2021-01-13 ENCOUNTER — OFFICE VISIT (OUTPATIENT)
Dept: SURGERY | Age: 26
End: 2021-01-13

## 2021-01-13 VITALS
SYSTOLIC BLOOD PRESSURE: 133 MMHG | DIASTOLIC BLOOD PRESSURE: 70 MMHG | BODY MASS INDEX: 24.98 KG/M2 | HEART RATE: 105 BPM | OXYGEN SATURATION: 100 % | TEMPERATURE: 97.3 F | HEIGHT: 63 IN | WEIGHT: 141 LBS

## 2021-01-13 DIAGNOSIS — K62.82 ANAL DYSPLASIA: Primary | ICD-10-CM

## 2021-01-13 DIAGNOSIS — B20 HIV INFECTION, UNSPECIFIED SYMPTOM STATUS (HCC): ICD-10-CM

## 2021-01-13 PROCEDURE — 99024 POSTOP FOLLOW-UP VISIT: CPT | Performed by: SURGERY

## 2021-01-13 NOTE — PROGRESS NOTES
[address] [address] [address] [facility]  Dept: [phone]  Dept Fax: [phone]  Loc: [phone]    Visit Date: 1/13/2021    Fallon Johnson is a 31 y.o. male who presents today for: post op EUA    Subjective:     Fallon Johnson is a 31 y.o. male here for postoperative visit after EUA, sampling of mucopurulent discharge per rectum 3 weeks ago. Overall doing much better. Much less pain. Patient's problem list, medications, past medical, surgical, family, and social histories were reviewed and updated in the chart as indicated today. Objective: There were no vitals taken for this visit. Assessment/Plan:       ASSESSMENT/PLAN:    Overall doing much better after EUA, collection of cultures, washout and rectal biopsy. Pathology benign, but previous biopsies had revealed atypical cells. Discussed close follow-up with his ID physician. I will see him back in 6 months for routine office anoscopy. DISPOSITION: Follow-up as needed    Note completed using dictation software, please excuse any errors. Referring/primary care physician updated through [employer] note if PCP was listed.     Electronically signed by Teresa Low MD on 1/13/2021 at 1:34 PM

## 2021-01-25 ENCOUNTER — TELEPHONE (OUTPATIENT)
Dept: SURGERY | Age: 26
End: 2021-01-25

## 2021-01-25 NOTE — TELEPHONE ENCOUNTER
Patient would like to know if the office received the short term disability from [employer].     Patient would also like to know if his FMLA paper work can be re-faxed to [employer] and would also like to  a physical copy at the office    Please contact the patient at [phone]

## 2021-02-01 LAB
FUNGUS (MYCOLOGY) CULTURE: NORMAL
FUNGUS (MYCOLOGY) CULTURE: NORMAL
FUNGUS STAIN: NORMAL
FUNGUS STAIN: NORMAL

## 2021-02-16 LAB
AFB CULTURE (MYCOBACTERIA): NORMAL
AFB CULTURE (MYCOBACTERIA): NORMAL
AFB SMEAR: NORMAL
AFB SMEAR: NORMAL

## 2021-05-20 ENCOUNTER — HOSPITAL ENCOUNTER (EMERGENCY)
Age: 26
Discharge: HOME | End: 2021-05-20
Payer: MEDICAID

## 2021-05-20 ENCOUNTER — APPOINTMENT (OUTPATIENT)
Dept: GENERAL RADIOLOGY | Age: 26
End: 2021-05-20
Payer: MEDICAID

## 2021-05-20 VITALS
RESPIRATION RATE: 18 BRPM | TEMPERATURE: 98.2 F | BODY MASS INDEX: 22.2 KG/M2 | OXYGEN SATURATION: 98 % | SYSTOLIC BLOOD PRESSURE: 149 MMHG | WEIGHT: 130 LBS | HEIGHT: 64 IN | DIASTOLIC BLOOD PRESSURE: 65 MMHG | HEART RATE: 86 BPM

## 2021-05-20 DIAGNOSIS — S61.411A LACERATION OF RIGHT HAND WITHOUT FOREIGN BODY, INITIAL ENCOUNTER: Primary | ICD-10-CM

## 2021-05-20 PROCEDURE — 12001 RPR S/N/AX/GEN/TRNK 2.5CM/<: CPT

## 2021-05-20 PROCEDURE — 73130 X-RAY EXAM OF HAND: CPT

## 2021-05-20 PROCEDURE — 6370000000 HC RX 637 (ALT 250 FOR IP): Performed by: NURSE PRACTITIONER

## 2021-05-20 PROCEDURE — 99284 EMERGENCY DEPT VISIT MOD MDM: CPT

## 2021-05-20 RX ORDER — IBUPROFEN 800 MG/1
800 TABLET ORAL EVERY 8 HOURS PRN
Qty: 20 TABLET | Refills: 0 | Status: SHIPPED | OUTPATIENT
Start: 2021-05-20

## 2021-05-20 RX ORDER — HYDROCODONE BITARTRATE AND ACETAMINOPHEN 5; 325 MG/1; MG/1
1 TABLET ORAL ONCE
Status: COMPLETED | OUTPATIENT
Start: 2021-05-20 | End: 2021-05-20

## 2021-05-20 RX ORDER — IBUPROFEN 800 MG/1
800 TABLET ORAL ONCE
Status: COMPLETED | OUTPATIENT
Start: 2021-05-20 | End: 2021-05-20

## 2021-05-20 RX ADMIN — IBUPROFEN 800 MG: 800 TABLET, FILM COATED ORAL at 22:04

## 2021-05-20 RX ADMIN — HYDROCODONE BITARTRATE AND ACETAMINOPHEN 1 TABLET: 5; 325 TABLET ORAL at 22:04

## 2021-05-20 ASSESSMENT — ENCOUNTER SYMPTOMS
ABDOMINAL PAIN: 0
SHORTNESS OF BREATH: 0
VOMITING: 0
CHEST TIGHTNESS: 0
DIARRHEA: 0
NAUSEA: 0

## 2021-05-20 ASSESSMENT — PAIN SCALES - GENERAL
PAINLEVEL_OUTOF10: 10
PAINLEVEL_OUTOF10: 7

## 2021-05-20 ASSESSMENT — PAIN DESCRIPTION - DESCRIPTORS: DESCRIPTORS: THROBBING

## 2021-05-20 ASSESSMENT — PAIN DESCRIPTION - PAIN TYPE: TYPE: ACUTE PAIN

## 2021-05-20 ASSESSMENT — PAIN DESCRIPTION - LOCATION: LOCATION: HAND

## 2021-05-21 NOTE — ED PROVIDER NOTES
[facility]        Pt Name: Raisa Arce  MRN: [id number]  [place] 1995  Date of evaluation: 5/20/2021  Provider: ERNIE Ruiz CNP  PCP: Referring Not In System (Inactive)  Note Started: 10:05 PM EDT       RAUDEL. I have evaluated this patient. My supervising physician was available for consultation. CHIEF COMPLAINT       Chief Complaint   Patient presents with    Hand Injury     Pt. arrived hand laceration to the R hand. Pt. states that he was gardening when tool that the his unable to name cut his hand. Pt. states this happened about 1300       HISTORY OF PRESENT ILLNESS   (Location, Timing/Onset, Context/Setting, Quality, Duration, Modifying Factors, Severity, Associated Signs and Symptoms)  Note limiting factors. Raisa Arce is a 31 y.o. male who presents with a Chief Complaint of presents to the emergency department with laceration to the right hand. The patient reports that he was in the garden using a nonpower tool when it kicked back and he accidentally injured himself. He reports severe pain that he rates as 10 of 10, aching. No mitigating exacerbating factors. Normal function motor and sensory intact. Patient is right-hand dominant. No other injury. Denies any headache, fever, lightheadedness, dizziness, visual disturbances. No chest pain or pressure. No neck or back pain. No shortness of breath, cough, or congestion. No abdominal pain, nausea, vomiting, diarrhea, constipation, or dysuria. No rash. Nursing Notes were all reviewed and agreed with or any disagreements were addressed in the HPI. REVIEW OF SYSTEMS    (2-9 systems for level 4, 10 or more for level 5)     Review of Systems   Constitutional: Negative for activity change, chills and fever. Respiratory: Negative for chest tightness and shortness of breath. Cardiovascular: Negative for chest pain. Gastrointestinal: Negative for abdominal pain, diarrhea, nausea and vomiting. Genitourinary: Negative for dysuria. Skin: Positive for wound. All other systems reviewed and are negative. Positives and Pertinent negatives as per HPI. Except as noted above in the ROS, all other systems were reviewed and negative. PAST MEDICAL HISTORY     Past Medical History:   Diagnosis Date    Asthma     History of seizures     as a child    HIV (human immunodeficiency virus infection) ([facility] [address]          SURGICAL HISTORY     Past Surgical History:   Procedure Laterality Date    COLONOSCOPY      ENDOSCOPY, COLON, DIAGNOSTIC      RECTAL SURGERY N/A 12/31/2020    EXAM UNDER ANESTHESIA, PERIRECTAL ABSCESS DRAINAGE Collection of gonorrhea and chlamydia  culture and rectal biopsy performed by Madelyn Carroll MD at [address] [facility] [address] 9/28/2020    [facility] performed by Compa Rodriguez MD at [address] N/A 9/30/2020    [address] [facility] performed by Compa Rodriguez MD at [address] N/A 9/30/2020    EGD DIAGNOSTIC ONLY performed by Compa Rodriguez MD at [facility]       Discharge Medication List as of 5/20/2021 11:26 PM      CONTINUE these medications which have NOT CHANGED    Details   Cholecalciferol (VITAMIN D) 50 MCG (2000 UT) CAPS capsule Take by mouthHistorical Med      Bictegravir-Emtricitab-Tenofov (BIKTARVY PO) Take by mouth daily Historical Med               ALLERGIES     Clotrimazole, Lactase, and Singulair [montelukast sodium]    FAMILYHISTORY     No family history on file.        SOCIAL HISTORY       Social History     Tobacco Use    Smoking status: Current Every Day Smoker     Packs/day: 0.50     Types: Cigarettes    Smokeless tobacco: Never Used   Substance Use Topics    Alcohol use: Yes     Comment: once weekly    Drug use: Yes     Frequency: 3.0 times per week     Types: Marijuana     Comment: 12/30/20       SCREENINGS             PHYSICAL EXAM    (up to 7 for level 4, 8 or more for level 5)     ED Triage Vitals [05/20/21 2120]   BP Temp Temp Source Pulse Resp SpO2 Height Weight   (!) 149/65 98.2 °F (36.8 °C) Oral 86 18 98 % 5' 4\" (1.626 m) 130 lb (59 kg)       Physical Exam  Vitals and nursing note reviewed. Constitutional:       Appearance: He is well-developed. He is not diaphoretic. HENT:      Head: Normocephalic and atraumatic. Right Ear: External ear normal.      Left Ear: External ear normal.   Eyes:      General:         Right eye: No discharge. Left eye: No discharge. Neck:      Vascular: No JVD. Cardiovascular:      Rate and Rhythm: Normal rate and regular rhythm. Pulses: Normal pulses. Heart sounds: Normal heart sounds. Pulmonary:      Effort: Pulmonary effort is normal. No respiratory distress. Breath sounds: Normal breath sounds. Abdominal:      Palpations: Abdomen is soft. Musculoskeletal:         General: Normal range of motion. Cervical back: Normal range of motion and neck supple. Skin:     General: Skin is warm and dry. Coloration: Skin is not pale. Comments: See photo   Neurological:      Mental Status: He is alert and oriented to person, place, and time. Psychiatric:         Behavior: Behavior normal.         DIAGNOSTIC RESULTS   LABS:    Labs Reviewed - No data to display    All other labs were within normal range or not returned as of this dictation. EKG: All EKG's are interpreted by the Emergency Department Physician in the absence of a cardiologist.  Please see their note for interpretation of EKG.     RADIOLOGY:   Non-plain film images such as CT, Ultrasound and MRI are read by the radiologist. Plain radiographic images are visualized and preliminarily interpreted by the ED Provider with the below findings:        Interpretation per the Radiologist below, if available at the time of this note:    XR HAND RIGHT (MIN 3 VIEWS)   Final Result   No acute bony abnormalities are noted           No results found. PROCEDURES   Unless otherwise noted below, none     Lac Repair    Date/Time: 5/21/2021 12:52 AM  Performed by: ERNIE Osorio CNP  Authorized by: ERNIE Osorio CNP     Consent:     Consent obtained:  Verbal    Consent given by:  Patient    Risks discussed:  Infection, pain, retained foreign body, tendon damage, vascular damage, poor wound healing, poor cosmetic result, need for additional repair and nerve damage  Anesthesia (see MAR for exact dosages): Anesthesia method:  Local infiltration    Local anesthetic:  Lidocaine 2% w/o epi  Laceration details:     Location:  Hand    Hand location:  R palm (dorsum of right hand, palm of right hand, each 1 cm)    Length (cm):  1  Repair type:     Repair type:  Simple  Pre-procedure details:     Preparation:  Patient was prepped and draped in usual sterile fashion  Exploration:     Wound exploration: wound explored through full range of motion    Treatment:     Area cleansed with:  Hibiclens    Amount of cleaning:  Extensive    Irrigation solution:  Sterile saline    Irrigation method:  Pressure wash  Skin repair:     Repair method:  Sutures    Suture size:  5-0    Suture material:  Nylon    Suture technique:  Simple interrupted    Number of sutures:  3  Approximation:     Approximation:  Close  Post-procedure details:     Dressing:  Bulky dressing    Patient tolerance of procedure:   Tolerated well, no immediate complications        CRITICAL CARE TIME   N/A    CONSULTS:  None      EMERGENCY DEPARTMENT COURSE and DIFFERENTIAL DIAGNOSIS/MDM:   Vitals:    Vitals:    05/20/21 2120   BP: (!) 149/65   Pulse: 86   Resp: 18   Temp: 98.2 °F (36.8 °C)   TempSrc: Oral   SpO2: 98%   Weight: 130 lb (59 kg)   Height: 5' 4\" (1.626 m)       Patient was given the following medications:  Medications   lidocaine 2 % injection 5 mL (has no administration in time range)   lidocaine 2 % injection 5 mL (has no administration in time range)   HYDROcodone-acetaminophen (NORCO) 5-325 MG per tablet 1 tablet (1 tablet Oral Given 5/20/21 2204)   ibuprofen (ADVIL;MOTRIN) tablet 800 mg (800 mg Oral Given 5/20/21 2204)           Briefly, this is a 31year old male that presents with a Chief Complaint of presents to the emergency department with laceration to the right hand. The patient reports that he was in the garden using a nonpower tool when it kicked back and he accidentally injured himself. He reports severe pain that he rates as 10 of 10, aching. No mitigating exacerbating factors. Normal function motor and sensory intact. Patient is right-hand dominant. No other injury. XR HAND RIGHT (MIN 3 VIEWS) (Final result)  Result time 05/20/21 22:55:51  Final result by Makeda Dee MD (05/20/21 22:55:51)                Impression:    No acute bony abnormalities are noted               Wound was anesthetized and cleaned. Repaired. Sutures to be removed in one week. Follow up with pcp for suture removal.  Uncertain of tetanus status, follow up with pcp. I estimate there is LOW risk for COMPARTMENT SYNDROME, TENDON OR NEUROVASCULAR INJURY, FOREIGN BODY OR signs of INFECTION thus I consider the discharge disposition reasonable. FINAL IMPRESSION      1.  Laceration of right hand without foreign body, initial encounter          DISPOSITION/PLAN   DISPOSITION Decision To Discharge 05/20/2021 11:25:06 PM      PATIENT REFERRED TO:  [facility] Emergency Department  [id number] [place] [address]  [phone]  In 1 week  For suture removal      DISCHARGE MEDICATIONS:  Discharge Medication List as of 5/20/2021 11:26 PM      START taking these medications    Details   ibuprofen (ADVIL;MOTRIN) 800 MG tablet Take 1 tablet by mouth every 8 hours as needed for Pain or Fever, Disp-20 tablet, R-0Normal             DISCONTINUED MEDICATIONS:  Discharge Medication List as of 5/20/2021 11:26 PM                 (Please note that portions of this note were completed with a voice recognition program.  Efforts were made to edit the dictations but occasionally words are mis-transcribed.)    ERNIE Bruner - CNP (electronically signed)           ERNIE Bruner CNP  05/21/21 4359

## 2021-08-02 ENCOUNTER — HOSPITAL ENCOUNTER (EMERGENCY)
Age: 26
Discharge: HOME | End: 2021-08-02
Attending: EMERGENCY MEDICINE
Payer: MEDICAID

## 2021-08-02 VITALS
DIASTOLIC BLOOD PRESSURE: 71 MMHG | OXYGEN SATURATION: 99 % | HEART RATE: 87 BPM | RESPIRATION RATE: 16 BRPM | TEMPERATURE: 98 F | SYSTOLIC BLOOD PRESSURE: 122 MMHG

## 2021-08-02 DIAGNOSIS — R11.2 NON-INTRACTABLE VOMITING WITH NAUSEA, UNSPECIFIED VOMITING TYPE: Primary | ICD-10-CM

## 2021-08-02 LAB
A/G RATIO: 0.9 (ref 1.1–2.2)
ALBUMIN SERPL-MCNC: 3.9 G/DL (ref 3.4–5)
ALP BLD-CCNC: 84 U/L (ref 40–129)
ALT SERPL-CCNC: 12 U/L (ref 10–40)
ANION GAP SERPL CALCULATED.3IONS-SCNC: 8 MMOL/L (ref 3–16)
ANISOCYTOSIS: ABNORMAL
AST SERPL-CCNC: 17 U/L (ref 15–37)
BANDED NEUTROPHILS RELATIVE PERCENT: 2 % (ref 0–7)
BASOPHILS ABSOLUTE: 0 K/UL (ref 0–0.2)
BASOPHILS RELATIVE PERCENT: 0 %
BILIRUB SERPL-MCNC: 0.3 MG/DL (ref 0–1)
BUN BLDV-MCNC: 12 MG/DL (ref 7–20)
CALCIUM SERPL-MCNC: 9.3 MG/DL (ref 8.3–10.6)
CHLORIDE BLD-SCNC: 103 MMOL/L (ref 99–110)
CO2: 30 MMOL/L (ref 21–32)
CREAT SERPL-MCNC: 1.1 MG/DL (ref 0.9–1.3)
EOSINOPHILS ABSOLUTE: 0.2 K/UL (ref 0–0.6)
EOSINOPHILS RELATIVE PERCENT: 4 %
GFR AFRICAN AMERICAN: >60
GFR NON-AFRICAN AMERICAN: >60
GLOBULIN: 4.2 G/DL
GLUCOSE BLD-MCNC: 89 MG/DL (ref 70–99)
HCT VFR BLD CALC: 40.4 % (ref 40.5–52.5)
HEMOGLOBIN: 13.5 G/DL (ref 13.5–17.5)
LYMPHOCYTES ABSOLUTE: 1.2 K/UL (ref 1–5.1)
LYMPHOCYTES RELATIVE PERCENT: 31 %
MCH RBC QN AUTO: 27.4 PG (ref 26–34)
MCHC RBC AUTO-ENTMCNC: 33.3 G/DL (ref 31–36)
MCV RBC AUTO: 82.2 FL (ref 80–100)
MONOCYTES ABSOLUTE: 0.2 K/UL (ref 0–1.3)
MONOCYTES RELATIVE PERCENT: 6 %
NEUTROPHILS ABSOLUTE: 2.2 K/UL (ref 1.7–7.7)
NEUTROPHILS RELATIVE PERCENT: 57 %
PDW BLD-RTO: 15.7 % (ref 12.4–15.4)
PLATELET # BLD: 246 K/UL (ref 135–450)
PMV BLD AUTO: 6.8 FL (ref 5–10.5)
POTASSIUM REFLEX MAGNESIUM: 3.9 MMOL/L (ref 3.5–5.1)
RBC # BLD: 4.92 M/UL (ref 4.2–5.9)
SODIUM BLD-SCNC: 141 MMOL/L (ref 136–145)
TOTAL PROTEIN: 8.1 G/DL (ref 6.4–8.2)
WBC # BLD: 3.8 K/UL (ref 4–11)

## 2021-08-02 PROCEDURE — 99283 EMERGENCY DEPT VISIT LOW MDM: CPT

## 2021-08-02 PROCEDURE — 96374 THER/PROPH/DIAG INJ IV PUSH: CPT

## 2021-08-02 PROCEDURE — 2580000003 HC RX 258: Performed by: EMERGENCY MEDICINE

## 2021-08-02 PROCEDURE — 80053 COMPREHEN METABOLIC PANEL: CPT

## 2021-08-02 PROCEDURE — 85025 COMPLETE CBC W/AUTO DIFF WBC: CPT

## 2021-08-02 PROCEDURE — 6360000002 HC RX W HCPCS: Performed by: EMERGENCY MEDICINE

## 2021-08-02 RX ORDER — 0.9 % SODIUM CHLORIDE 0.9 %
1000 INTRAVENOUS SOLUTION INTRAVENOUS ONCE
Status: COMPLETED | OUTPATIENT
Start: 2021-08-02 | End: 2021-08-02

## 2021-08-02 RX ORDER — ONDANSETRON 4 MG/1
4 TABLET, ORALLY DISINTEGRATING ORAL 3 TIMES DAILY PRN
Qty: 15 TABLET | Refills: 0 | Status: SHIPPED | OUTPATIENT
Start: 2021-08-02

## 2021-08-02 RX ORDER — ONDANSETRON 2 MG/ML
4 INJECTION INTRAMUSCULAR; INTRAVENOUS ONCE
Status: COMPLETED | OUTPATIENT
Start: 2021-08-02 | End: 2021-08-02

## 2021-08-02 RX ADMIN — SODIUM CHLORIDE 1000 ML: 9 INJECTION, SOLUTION INTRAVENOUS at 07:34

## 2021-08-02 RX ADMIN — ONDANSETRON 4 MG: 2 INJECTION INTRAMUSCULAR; INTRAVENOUS at 07:35

## 2021-08-02 NOTE — LETTER
[facility] Emergency Department  [address] [address]  Phone: [phone]    No name on file. August 2, 2021     Patient: Mare Howard   YOB: 1995   Date of Visit: 8/2/2021       To Whom It May Concern: It is my medical opinion that Selin Boggs may return to full duty immediately with no restrictions. If you have any questions or concerns, please don't hesitate to call.     Sincerely,         Mark Duenas RN

## 2021-08-02 NOTE — ED NOTES
Patient alert and oriented, denies any pain at this time. Patient states he has been feeling nauseated as well as weak since last night. Family at bedside. Patient medicated per MAR. Updated on plan of care. Bed locked and in lowest position, call light within reach.       Margueritte Boas, [place]  08/02/21 9844

## 2021-08-02 NOTE — ED PROVIDER NOTES
[facility]  [facility]      Pt Name: Chantal Uribe  MRN: [id number]  [place] 1995  Date ofevaluation: 8/2/2021  Provider: Sandeep Alba MD    CHIEF COMPLAINT       Chief Complaint   Patient presents with    Illness     feeling nauseous and weak since last night       HPI    HISTORY OF PRESENT ILLNESS   (Location/Symptom, Timing/Onset,Context/Setting, Quality, Duration, Modifying Factors, Severity)  Note limiting factors. Chantal Uribe is a 31 y.o. male who presents to the emergency department with nausea and vomiting. This is a 27-year-old male presents with nausea vomiting that started last night. He also had some abdominal discomfort but denies any significant pain. Patient admits to smoking marijuana occasionally. He denies any other complaints. He denies any fevers or chills. NursingNotes were reviewed. Review of Systems    REVIEW OF SYSTEMS    (2-9 systems for level 4, 10 or more for level 5)     Review of Systems   Constitutional: Negative for fever. HENT: Negative for rhinorrhea and sore throat. Eyes: Negative for redness. Respiratory: Negative for shortness of breath. Cardiovascular: Negative for chest pain. Gastrointestinal: Negative for abdominal pain. Positive nausea and vomiting. Genitourinary: Negative for flank pain. Neurological: Negative for headaches. Hematological: Negative for adenopathy. Psychiatric/Behavioral: Negative for confusion. Except as noted above the remainder of the review of systems was reviewed and negative.        PAST MEDICAL HISTORY     Past Medical History:   Diagnosis Date    Asthma     History of seizures     as a child    HIV (human immunodeficiency virus infection) ([facility] [address]          SURGICALHISTORY       Past Surgical History:   Procedure Laterality Date    COLONOSCOPY      ENDOSCOPY, COLON, DIAGNOSTIC      RECTAL SURGERY N/A 12/31/2020    EXAM UNDER ANESTHESIA, PERIRECTAL ABSCESS DRAINAGE Collection of gonorrhea and chlamydia  culture and rectal biopsy performed by Alissa Bennett MD at [address] [place] [address] 9/28/2020    [facility] performed by Constanza Thomas MD at [facility] 9/30/2020    [address] [facility] performed by Constanza Thomas MD at [address] [facility] 9/30/2020    EGD DIAGNOSTIC ONLY performed by Constanza Thomas MD at [address] [facility]       Previous Medications    BICTEGRAVIR-EMTRICITAB-TENOFOV (BIKTARVY PO)    Take by mouth daily     CHOLECALCIFEROL (VITAMIN D) 50 MCG (2000 UT) CAPS CAPSULE    Take by mouth    IBUPROFEN (ADVIL;MOTRIN) 800 MG TABLET    Take 1 tablet by mouth every 8 hours as needed for Pain or Fever       ALLERGIES     Clotrimazole, Lactase, and Singulair [montelukast sodium]    FAMILY HISTORY     History reviewed. No pertinent family history. SOCIAL HISTORY       Social History     Socioeconomic History    Marital status: Single     Spouse name: None    Number of children: None    Years of education: None    Highest education level: None   Occupational History    None   Tobacco Use    Smoking status: Current Every Day Smoker     Packs/day: 0.50     Types: Cigarettes    Smokeless tobacco: Never Used   Substance and Sexual Activity    Alcohol use: Yes     Comment: once weekly    Drug use: Yes     Frequency: 3.0 times per week     Types: Marijuana     Comment: 12/30/20    Sexual activity: None   Other Topics Concern    None   Social History Narrative    None     Social Determinants of Health     Financial Resource Strain:     Difficulty of Paying Living Expenses:    Food Insecurity:     Worried About Running Out of Food in the Last Year:     Ran Out of Food in the Last Year:    Transportation Needs:     Lack of Transportation (Medical):      Lack of Transportation (Non-Medical):    Physical Activity:     Days of Exercise per Week:     Minutes of Exercise per Session:    Stress:     Feeling of Stress :    Social Connections:     Frequency of Communication with Friends and Family:     Frequency of Social Gatherings with Friends and Family:     Attends Sabianism Services:     Active Member of Clubs or Organizations:     Attends Club or Organization Meetings:     Marital Status:    Intimate Partner Violence:     Fear of Current or Ex-Partner:     Emotionally Abused:     Physically Abused:     Sexually Abused:        SCREENINGS             PHYSICAL EXAM    (up to 7 for level 4, 8 or more for level 5)     ED Triage Vitals [08/02/21 0615]   BP Temp Temp src Pulse Resp SpO2 Height Weight   137/84 98 °F (36.7 °C) -- 87 16 100 % -- --       Physical Exam:      General Appearance:  Alert, cooperative, appears stated age. Head:  Normocephalic, without obvious abnormality, atraumatic. Eyes:  conjunctiva/corneas clear, EOM's intact. Sclera anicteric. ENT:  Mucous remains moist and pink   Neck: Supple, symmetrical, trachea midline, no adenopathy. No jugular venous distention. Lungs:    Clear to auscultation bilaterally   Chest Wall:   No pain to palpation   Heart:   Genitourinary:  Regular rate rhythm with no murmurs rubs gallops  Unremarkable   Abdomen:    Soft and benign. Thin. No guarding rebound. Negative Olmos sign. No pain over McBurney's point. Extremities:  No clubbing cyanosis or edema   Pulses:  Good throughout   Skin:  No rashes or lesions to exposed skin. Neurologic: Alert and oriented X 3. DIAGNOSTIC RESULTS         RADIOLOGY:   Non-plain filmimages such as CT, Ultrasound and MRI are read by the radiologist. Plain radiographic images are visualized and preliminarily interpreted by the emergency physician with the below findings:    See below    Interpretation per the Radiologist below, if available at the time ofthis note:     All incidental findings were discussed with the patient. No orders to display         ED BEDSIDE ULTRASOUND:   Performed by ED Physician - none    LABS:  Labs Reviewed   CBC WITH AUTO DIFFERENTIAL - Abnormal; Notable for the following components:       Result Value    WBC 3.8 (*)     Hematocrit 40.4 (*)     RDW 15.7 (*)     Anisocytosis Occasional (*)     All other components within normal limits    Narrative:     Performed at:  [facility] [address],  [place], [place]   Phone [phone]   COMPREHENSIVE METABOLIC PANEL W/ REFLEX TO MG FOR LOW K - Abnormal; Notable for the following components:    Albumin/Globulin Ratio 0.9 (*)     All other components within normal limits    Narrative:     Performed at:  [facility] [address],  [place], [place]   Phone [phone]       All other labs were within normal range or not returned as of this dictation. EMERGENCY DEPARTMENT COURSE and DIFFERENTIAL DIAGNOSIS/MDM:   Vitals:    Vitals:    08/02/21 0730 08/02/21 0745 08/02/21 0800 08/02/21 0815   BP: 134/81 125/75 121/70 122/71   Pulse:       Resp:       Temp:       SpO2: 98% 98% 99% 99%           MDM    Patient has remained stable throughout his hospital course. His medical evaluation was unremarkable. His white cell count was slightly low and his chemistry profile was unremarkable normal.  After liter of normal saline and Zofran the patient feels much better. He is able to tolerate fluids. He was discharged with oral Zofran, clear liquids for the next 24 hours and advance as tolerated. He is to return if worse. He was given appropriate follow-up. REASSESSMENT              CONSULTS:  None    PROCEDURES:  Unless otherwise noted below, none     Procedures    FINAL IMPRESSION      1.  Non-intractable vomiting with nausea, unspecified vomiting type          DISPOSITION/PLAN   DISPOSITION Decision To Discharge 08/02/2021 09:01:24 AM      PATIENT REFERREDTO:  Referring Not In System    Call in 2 days  As needed      DISCHARGEMEDICATIONS:  New Prescriptions    ONDANSETRON (ZOFRAN-ODT) 4 MG DISINTEGRATING TABLET    Place 1 tablet under the tongue 3 times daily as needed for Nausea or Vomiting     Controlled Substances Monitoring:     No flowsheet data found.     (Please note that portions of this note were completed with a voice recognition program.  Efforts were made to edit the dictations but occasionally words are mis-transcribed.)    Roger Rand MD (electronically signed)  Attending Emergency Physician          Roger Rand MD  08/02/21 9793

## 2021-08-02 NOTE — ED NOTES
Discharge paperwork reviewed with patient, v/u. Patient ambulating to waiting room.       Keny De Leon, [facility]  08/02/21 9149

## 2021-08-17 ENCOUNTER — HOSPITAL ENCOUNTER (EMERGENCY)
Age: 26
Discharge: HOME | End: 2021-08-17
Payer: MEDICAID

## 2021-08-17 VITALS
HEART RATE: 94 BPM | DIASTOLIC BLOOD PRESSURE: 68 MMHG | HEIGHT: 64 IN | RESPIRATION RATE: 18 BRPM | WEIGHT: 132 LBS | BODY MASS INDEX: 22.53 KG/M2 | SYSTOLIC BLOOD PRESSURE: 119 MMHG | OXYGEN SATURATION: 98 % | TEMPERATURE: 98 F

## 2021-08-17 DIAGNOSIS — U07.1 COVID-19 VIRUS INFECTION: Primary | ICD-10-CM

## 2021-08-17 LAB
A/G RATIO: 0.9 (ref 1.1–2.2)
ALBUMIN SERPL-MCNC: 3.7 G/DL (ref 3.4–5)
ALP BLD-CCNC: 95 U/L (ref 40–129)
ALT SERPL-CCNC: 12 U/L (ref 10–40)
ANION GAP SERPL CALCULATED.3IONS-SCNC: 8 MMOL/L (ref 3–16)
AST SERPL-CCNC: 18 U/L (ref 15–37)
BASOPHILS ABSOLUTE: 0 K/UL (ref 0–0.2)
BASOPHILS RELATIVE PERCENT: 0.7 %
BILIRUB SERPL-MCNC: 0.3 MG/DL (ref 0–1)
BUN BLDV-MCNC: 5 MG/DL (ref 7–20)
CALCIUM SERPL-MCNC: 9 MG/DL (ref 8.3–10.6)
CHLORIDE BLD-SCNC: 102 MMOL/L (ref 99–110)
CO2: 27 MMOL/L (ref 21–32)
CREAT SERPL-MCNC: 1.1 MG/DL (ref 0.9–1.3)
EOSINOPHILS ABSOLUTE: 0.1 K/UL (ref 0–0.6)
EOSINOPHILS RELATIVE PERCENT: 1.2 %
GFR AFRICAN AMERICAN: >60
GFR NON-AFRICAN AMERICAN: >60
GLOBULIN: 4.2 G/DL
GLUCOSE BLD-MCNC: 88 MG/DL (ref 70–99)
HCT VFR BLD CALC: 38.2 % (ref 40.5–52.5)
HEMOGLOBIN: 12.9 G/DL (ref 13.5–17.5)
LIPASE: 18 U/L (ref 13–60)
LYMPHOCYTES ABSOLUTE: 0.7 K/UL (ref 1–5.1)
LYMPHOCYTES RELATIVE PERCENT: 13.2 %
MCH RBC QN AUTO: 28 PG (ref 26–34)
MCHC RBC AUTO-ENTMCNC: 33.9 G/DL (ref 31–36)
MCV RBC AUTO: 82.6 FL (ref 80–100)
MONOCYTES ABSOLUTE: 0.5 K/UL (ref 0–1.3)
MONOCYTES RELATIVE PERCENT: 8.8 %
NEUTROPHILS ABSOLUTE: 4.2 K/UL (ref 1.7–7.7)
NEUTROPHILS RELATIVE PERCENT: 76.1 %
PDW BLD-RTO: 15.1 % (ref 12.4–15.4)
PLATELET # BLD: 209 K/UL (ref 135–450)
PMV BLD AUTO: 6.7 FL (ref 5–10.5)
POTASSIUM REFLEX MAGNESIUM: 3.8 MMOL/L (ref 3.5–5.1)
RBC # BLD: 4.62 M/UL (ref 4.2–5.9)
SARS-COV-2, NAAT: DETECTED
SODIUM BLD-SCNC: 137 MMOL/L (ref 136–145)
TOTAL PROTEIN: 7.9 G/DL (ref 6.4–8.2)
WBC # BLD: 5.5 K/UL (ref 4–11)

## 2021-08-17 PROCEDURE — 6360000002 HC RX W HCPCS: Performed by: GENERAL ACUTE CARE HOSPITAL

## 2021-08-17 PROCEDURE — 96375 TX/PRO/DX INJ NEW DRUG ADDON: CPT

## 2021-08-17 PROCEDURE — 80053 COMPREHEN METABOLIC PANEL: CPT

## 2021-08-17 PROCEDURE — 87635 SARS-COV-2 COVID-19 AMP PRB: CPT

## 2021-08-17 PROCEDURE — 85025 COMPLETE CBC W/AUTO DIFF WBC: CPT

## 2021-08-17 PROCEDURE — 2500000003 HC RX 250 WO HCPCS: Performed by: GENERAL ACUTE CARE HOSPITAL

## 2021-08-17 PROCEDURE — 83690 ASSAY OF LIPASE: CPT

## 2021-08-17 PROCEDURE — 96374 THER/PROPH/DIAG INJ IV PUSH: CPT

## 2021-08-17 PROCEDURE — 2580000003 HC RX 258: Performed by: GENERAL ACUTE CARE HOSPITAL

## 2021-08-17 PROCEDURE — 36415 COLL VENOUS BLD VENIPUNCTURE: CPT

## 2021-08-17 PROCEDURE — 99282 EMERGENCY DEPT VISIT SF MDM: CPT

## 2021-08-17 RX ORDER — 0.9 % SODIUM CHLORIDE 0.9 %
1000 INTRAVENOUS SOLUTION INTRAVENOUS ONCE
Status: COMPLETED | OUTPATIENT
Start: 2021-08-17 | End: 2021-08-17

## 2021-08-17 RX ORDER — DEXAMETHASONE 4 MG/1
4 TABLET ORAL
Qty: 10 TABLET | Refills: 0 | Status: SHIPPED | OUTPATIENT
Start: 2021-08-17 | End: 2021-08-27

## 2021-08-17 RX ORDER — ONDANSETRON 2 MG/ML
4 INJECTION INTRAMUSCULAR; INTRAVENOUS ONCE
Status: COMPLETED | OUTPATIENT
Start: 2021-08-17 | End: 2021-08-17

## 2021-08-17 RX ORDER — PROMETHAZINE HYDROCHLORIDE 25 MG/1
25 TABLET ORAL EVERY 6 HOURS PRN
Qty: 20 TABLET | Refills: 0 | Status: SHIPPED | OUTPATIENT
Start: 2021-08-17 | End: 2021-08-24

## 2021-08-17 RX ORDER — ONDANSETRON 2 MG/ML
INJECTION INTRAMUSCULAR; INTRAVENOUS
Status: DISCONTINUED
Start: 2021-08-17 | End: 2021-08-17 | Stop reason: HOSPADM

## 2021-08-17 RX ADMIN — SODIUM CHLORIDE 1000 ML: 9 INJECTION, SOLUTION INTRAVENOUS at 10:14

## 2021-08-17 RX ADMIN — ONDANSETRON 4 MG: 2 INJECTION INTRAMUSCULAR; INTRAVENOUS at 10:15

## 2021-08-17 RX ADMIN — FAMOTIDINE 20 MG: 10 INJECTION, SOLUTION INTRAVENOUS at 10:15

## 2021-08-17 ASSESSMENT — PAIN DESCRIPTION - LOCATION: LOCATION: GENERALIZED

## 2021-08-17 ASSESSMENT — PAIN SCALES - GENERAL: PAINLEVEL_OUTOF10: 10

## 2021-08-17 NOTE — ED NOTES
Pt discharged in stable condition, VSS, no signs of distress. Discharge instructions and meds reviewed. Pt verbalizes understanding and states no further questions or concerns unaddressed.        Funmilayo Lowe RN  08/17/21 0680

## 2021-08-18 ENCOUNTER — CARE COORDINATION (OUTPATIENT)
Dept: CASE MANAGEMENT | Age: 26
End: 2021-08-18

## 2021-08-18 NOTE — CARE COORDINATION
Attempted to contact patient for ED f/u; numbers listed are invalid  No further outreach scheduled with this ACM; episode of care resolved

## 2021-08-22 NOTE — ED PROVIDER NOTES
HENT: Negative for congestion. Eyes: Negative for visual disturbance. Respiratory: Positive for cough. Negative for chest tightness, shortness of breath and wheezing. Cardiovascular: Negative for chest pain and palpitations. Gastrointestinal: Positive for nausea. Negative for abdominal pain and vomiting. Endocrine: Negative for polydipsia and polyuria. Genitourinary: Negative for difficulty urinating and dysuria. Musculoskeletal: Positive for myalgias. Negative for back pain, neck pain and neck stiffness. Skin: Negative for rash and wound. Allergic/Immunologic: Positive for immunocompromised state. Neurological: Negative for dizziness and headaches. Psychiatric/Behavioral: Negative for suicidal ideas. Positives and Pertinent negatives as per HPI. Except as noted above in the ROS, all other systems were reviewed and negative.        PAST MEDICAL HISTORY     Past Medical History:   Diagnosis Date    Asthma     History of seizures     as a child    [facility] immunodeficiency virus [facility]     Immune deficiency disorder ([facility]          SURGICAL HISTORY     Past Surgical History:   Procedure Laterality Date    COLONOSCOPY      ENDOSCOPY, COLON, DIAGNOSTIC      RECTAL SURGERY N/A 12/31/2020    EXAM UNDER ANESTHESIA, PERIRECTAL ABSCESS DRAINAGE Collection of gonorrhea and chlamydia  culture and rectal biopsy performed by Jose Monroe MD at [address] [place] [address] 9/28/2020    SIGMOIDOSCOPY BIOPSY FLEXIBLE performed by Jose Solorio MD at [facility] N/A 9/30/2020    SIGMOIDOSCOPY DIAGNOSTIC FLEXIBLE performed by Jose Solorio MD at [address] [place] [address] N/A 9/30/2020    EGD DIAGNOSTIC ONLY performed by Jose Solorio MD at [address]       Discharge Medication List as of 8/17/2021 11:22 AM      CONTINUE these medications which have NOT CHANGED    Details   Bictegravir-Emtricitab-Tenofov (BIKTARVY PO) Take by mouth daily Historical Med      ondansetron (ZOFRAN-ODT) 4 MG disintegrating tablet Place 1 tablet under the tongue 3 times daily as needed for Nausea or Vomiting, Disp-15 tablet, R-0Normal      ibuprofen (ADVIL;MOTRIN) 800 MG tablet Take 1 tablet by mouth every 8 hours as needed for Pain or Fever, Disp-20 tablet, R-0Normal      Cholecalciferol (VITAMIN D) 50 MCG (2000 UT) CAPS capsule Take by mouthHistorical Med               ALLERGIES     Clotrimazole, Lactase, and Singulair [montelukast sodium]    FAMILYHISTORY     History reviewed. No pertinent family history. SOCIAL HISTORY       Social History     Tobacco Use    Smoking status: Current Every Day Smoker     Packs/day: 0.50     Types: Cigarettes    Smokeless tobacco: Never Used   Substance Use Topics    Alcohol use: Yes     Comment: once weekly    Drug use: Yes     Frequency: 3.0 times per week     Types: Marijuana     Comment: 12/30/20       SCREENINGS             PHYSICAL EXAM    (up to 7 for level 4, 8 or more for level 5)     ED Triage Vitals [08/17/21 0908]   BP Temp Temp Source Pulse Resp SpO2 Height Weight   119/68 98 °F (36.7 °C) Oral 94 18 98 % 5' 4\" (1.626 m) 132 lb (59.9 kg)       Physical Exam  Vitals and nursing note reviewed. Constitutional:       Appearance: Normal appearance. He is not toxic-appearing or diaphoretic. HENT:      Head: Normocephalic and atraumatic. Right Ear: External ear normal.      Left Ear: External ear normal.      Nose: Nose normal.      Mouth/Throat:      Mouth: Mucous membranes are moist.   Eyes:      General:         Right eye: No discharge. Left eye: No discharge. Extraocular Movements: Extraocular movements intact. Cardiovascular:      Rate and Rhythm: Normal rate and regular rhythm. Pulses: Normal pulses. Heart sounds: Normal heart sounds. Pulmonary:      Effort: Pulmonary effort is normal. No respiratory distress.       Breath sounds: Normal breath sounds. Abdominal:      General: Bowel sounds are normal.      Palpations: Abdomen is soft. Tenderness: There is no abdominal tenderness. Musculoskeletal:         General: Normal range of motion. Cervical back: Normal range of motion and neck supple. Skin:     General: Skin is warm and dry. Capillary Refill: Capillary refill takes less than 2 seconds. Neurological:      General: No focal deficit present. Mental Status: He is alert and oriented to person, place, and time.    Psychiatric:         Mood and Affect: Mood normal.         Behavior: Behavior normal.         DIAGNOSTIC RESULTS   LABS:    Labs Reviewed   COVID-19, RAPID - Abnormal; Notable for the following components:       Result Value    SARS-CoV-2, NAAT DETECTED (*)     All other components within normal limits    Narrative:     [place] [facility]  [facility] tel. [phone],  Microbiology results called to and read back by Jhoan Garcia rn, 08/17/2021  10:10, by Fátima Ndiaye  Performed at:  [facility] [address], [place]   Phone [phone]   CBC WITH AUTO DIFFERENTIAL - Abnormal; Notable for the following components:    Hemoglobin 12.9 (*)     Hematocrit 38.2 (*)     Lymphocytes Absolute 0.7 (*)     All other components within normal limits    Narrative:     Performed at:  [facility] [address], [place]   Phone [phone]   COMPREHENSIVE METABOLIC PANEL W/ REFLEX TO MG FOR LOW K - Abnormal; Notable for the following components:    BUN 5 (*)     Albumin/Globulin Ratio 0.9 (*)     All other components within normal limits    Narrative:     Performed at:  [facility]  [address], [place]   Phone [phone]   LIPASE    Narrative:     Performed at:  [facility] [address], [place]   Phone [phone]       When ordered only abnormal conditions requiring immediate intervention. Patient was counseled on the importance of close outpatient follow-up with his infectious disease physician. He is encouraged to increase his fluid intake. He agrees to take OTC Tylenol or ibuprofen for pain, fever, or body aches. He agrees to follow-up within the next 2 to 3 days for reevaluation. He agrees return to nearest ED for any worsening symptoms. He is discharged home in stable condition. FINAL IMPRESSION      1. COVID-19 virus infection          DISPOSITION/PLAN   DISPOSITION Decision To Discharge 08/17/2021 10:45:35 AM      PATIENT REFERRED TO:  MD Alena Bhakta [address] [place] [address]  [phone]    Today  Your Infectious disease physican      DISCHARGE MEDICATIONS:  Discharge Medication List as of 8/17/2021 11:22 AM      START taking these medications    Details   dexamethasone (DECADRON) 4 MG tablet Take 1 tablet by mouth daily (with breakfast) for 10 days, Disp-10 tablet, R-0Print      promethazine (PHENERGAN) 25 MG tablet Take 1 tablet by mouth every 6 hours as needed for Nausea WARNING:  May cause drowsiness. May impair ability to operate vehicles or machinery.   Do not use in combination with alcohol., Disp-20 tablet, R-0Print             DISCONTINUED MEDICATIONS:  Discharge Medication List as of 8/17/2021 11:22 AM                 (Please note that portions of this note were completed with a voice recognition program.  Efforts were made to edit the dictations but occasionally words are mis-transcribed.)    ERNIE Langley CNP (electronically signed)            ERNIE Langley CNP  08/23/21 8748

## 2021-08-23 ASSESSMENT — ENCOUNTER SYMPTOMS
VOMITING: 0
COUGH: 1
ABDOMINAL PAIN: 0
WHEEZING: 0
NAUSEA: 1
SHORTNESS OF BREATH: 0
CHEST TIGHTNESS: 0
BACK PAIN: 0

## 2021-12-08 ENCOUNTER — HOSPITAL ENCOUNTER (EMERGENCY)
Age: 26
Discharge: HOME | End: 2021-12-08
Payer: MEDICAID

## 2021-12-08 VITALS
BODY MASS INDEX: 23.51 KG/M2 | HEIGHT: 64 IN | DIASTOLIC BLOOD PRESSURE: 63 MMHG | OXYGEN SATURATION: 100 % | SYSTOLIC BLOOD PRESSURE: 134 MMHG | HEART RATE: 105 BPM | RESPIRATION RATE: 18 BRPM | WEIGHT: 137.7 LBS | TEMPERATURE: 98.6 F

## 2021-12-08 DIAGNOSIS — R51.9 ACUTE NONINTRACTABLE HEADACHE, UNSPECIFIED HEADACHE TYPE: Primary | ICD-10-CM

## 2021-12-08 PROCEDURE — U0003 INFECTIOUS AGENT DETECTION BY NUCLEIC ACID (DNA OR RNA); SEVERE ACUTE RESPIRATORY SYNDROME CORONAVIRUS 2 (SARS-COV-2) (CORONAVIRUS DISEASE [COVID-19]), AMPLIFIED PROBE TECHNIQUE, MAKING USE OF HIGH THROUGHPUT TECHNOLOGIES AS DESCRIBED BY CMS-2020-01-R: HCPCS

## 2021-12-08 PROCEDURE — 6370000000 HC RX 637 (ALT 250 FOR IP): Performed by: PHYSICIAN ASSISTANT

## 2021-12-08 PROCEDURE — 99283 EMERGENCY DEPT VISIT LOW MDM: CPT

## 2021-12-08 PROCEDURE — U0005 INFEC AGEN DETEC AMPLI PROBE: HCPCS

## 2021-12-08 RX ORDER — IBUPROFEN 600 MG/1
600 TABLET ORAL ONCE
Status: COMPLETED | OUTPATIENT
Start: 2021-12-08 | End: 2021-12-08

## 2021-12-08 RX ADMIN — IBUPROFEN 600 MG: 600 TABLET ORAL at 20:54

## 2021-12-08 ASSESSMENT — ENCOUNTER SYMPTOMS
NAUSEA: 1
DIARRHEA: 0
RHINORRHEA: 0
WHEEZING: 0
SHORTNESS OF BREATH: 0
VOMITING: 1
COUGH: 0
ABDOMINAL PAIN: 0

## 2021-12-08 ASSESSMENT — PAIN SCALES - GENERAL
PAINLEVEL_OUTOF10: 7
PAINLEVEL_OUTOF10: 7

## 2021-12-09 LAB — SARS-COV-2: NOT DETECTED

## 2021-12-09 NOTE — ED NOTES
Patient medicated per MAR, swabbed for covid and given big gulp.       Vicente Burris RN  12/08/21 205

## 2021-12-09 NOTE — ED NOTES
Patient discharged to home in stable condition alert and oriented x4 at this time. Patient verbalized understanding of discharge instructions, no questions at this time.       Arlyn Leach RN  12/08/21 6885

## 2021-12-09 NOTE — ED PROVIDER NOTES
[address] [facility]        Pt Name: Joaquin Barrera  MRN: [id number]  [place] 1995  Date of evaluation: 12/8/2021  Provider: Helder Mackenzie PA-C  PCP: Referring Not In System (Inactive)  Note Started: 8:44 PM EST       RAUDEL. I have evaluated this patient. My supervising physician was available for consultation. CHIEF COMPLAINT       Chief Complaint   Patient presents with    Headache     PT states he has been having a bad headache all day and had one vomiting episode earlier        HISTORY OF PRESENT ILLNESS   (Location, Timing/Onset, Context/Setting, Quality, Duration, Modifying Factors, Severity, Associated Signs and Symptoms)  Note limiting factors. Chief Complaint: VINICIUS Barrera is a 31 y.o. male who presents for evaluation of headache this started this morning. He had single episode of associated emesis. No other abdominal pain nausea or diarrhea. No urinary complaints. No neck pain or stiffness. No fevers or chills. No dizziness/lightheadedness, weakness, visual disturbances or syncope. Patient states he has had similar headache in the past.  Not the worst headache of his life. Not sudden onset or thunderclap in nature. He has not tried taking anything for this. No cough congestion runny nose. He is vaccinated with JJ vaccine earlier this year. He has no other complaints or concerns at this time. Nursing Notes were all reviewed and agreed with or any disagreements were addressed in the HPI. REVIEW OF SYSTEMS    (2-9 systems for level 4, 10 or more for level 5)     Review of Systems   Constitutional: Negative for appetite change, chills and fever. HENT: Negative for congestion and rhinorrhea. Eyes: Negative for visual disturbance. Respiratory: Negative for cough, shortness of breath and wheezing. Cardiovascular: Negative for chest pain. Gastrointestinal: Positive for nausea and vomiting. Negative for abdominal pain and diarrhea. Genitourinary: Negative for difficulty urinating, dysuria and hematuria. Musculoskeletal: Negative for neck pain and neck stiffness. Skin: Negative for rash. Neurological: Positive for headaches. Negative for dizziness, syncope, weakness and light-headedness. Positives and Pertinent negatives as per HPI. Except as noted above in the ROS, all other systems were reviewed and negative.        PAST MEDICAL HISTORY     Past Medical History:   Diagnosis Date    Asthma     History of seizures     as a child    [facility] infection) [facility]     Immune deficiency disorder ([facility]          SURGICAL HISTORY     Past Surgical History:   Procedure Laterality Date    COLONOSCOPY      ENDOSCOPY, COLON, DIAGNOSTIC      RECTAL SURGERY N/A 12/31/2020    EXAM UNDER ANESTHESIA, PERIRECTAL ABSCESS DRAINAGE Collection of gonorrhea and chlamydia  culture and rectal biopsy performed by Galina Hernandez MD at [address] [place] [address] 9/28/2020    [facility] performed by Dalia Perez MD at [facility] N/A 9/30/2020    [address] [facility] performed by Dalia Perez MD at [place] [address] 9/30/2020    EGD DIAGNOSTIC ONLY performed by Dalia Perez MD at [address]       Discharge Medication List as of 12/8/2021  9:45 PM      CONTINUE these medications which have NOT CHANGED    Details   ondansetron (ZOFRAN-ODT) 4 MG disintegrating tablet Place 1 tablet under the tongue 3 times daily as needed for Nausea or Vomiting, Disp-15 tablet, R-0Normal      ibuprofen (ADVIL;MOTRIN) 800 MG tablet Take 1 tablet by mouth every 8 hours as needed for Pain or Fever, Disp-20 tablet, R-0Normal      Cholecalciferol (VITAMIN D) 50 MCG (2000 UT) CAPS capsule Take by mouthHistorical Med      Bictegravir-Emtricitab-Tenofov (BIKTARVY PO) Take by mouth daily Historical Med               ALLERGIES     Clotrimazole, Lactase, and Singulair [montelukast sodium]    FAMILYHISTORY     History reviewed. No pertinent family history. SOCIAL HISTORY       Social History     Tobacco Use    Smoking status: Current Every Day Smoker     Packs/day: 0.50     Types: Cigarettes    Smokeless tobacco: Never Used   Substance Use Topics    Alcohol use: Yes     Comment: once weekly    Drug use: Yes     Frequency: 3.0 times per week     Types: Marijuana Maurita Handsome)     Comment: 12/30/20       SCREENINGS             PHYSICAL EXAM    (up to 7 for level 4, 8 or more for level 5)     ED Triage Vitals [12/08/21 2028]   BP Temp Temp Source Pulse Resp SpO2 Height Weight   134/63 98.6 °F (37 °C) Oral 105 18 100 % 5' 4\" (1.626 m) 137 lb 11.2 oz (62.5 kg)       Physical Exam  Vitals and nursing note reviewed. Constitutional:       General: He is not in acute distress. Appearance: He is well-developed. He is not ill-appearing, toxic-appearing or diaphoretic. HENT:      Head: Normocephalic and atraumatic. Right Ear: External ear normal.      Left Ear: External ear normal.      Nose: Nose normal.   Eyes:      General:         Right eye: No discharge. Left eye: No discharge. Extraocular Movements: Extraocular movements intact. Conjunctiva/sclera: Conjunctivae normal.      Pupils: Pupils are equal, round, and reactive to light. Cardiovascular:      Rate and Rhythm: Normal rate and regular rhythm. Heart sounds: Normal heart sounds. Pulmonary:      Effort: Pulmonary effort is normal. No respiratory distress. Breath sounds: Normal breath sounds. Abdominal:      General: There is no distension. Palpations: Abdomen is soft. Musculoskeletal:         General: Normal range of motion. Cervical back: Normal range of motion and neck supple. No rigidity or tenderness. Lymphadenopathy:      Cervical: No cervical adenopathy.    Skin:     General: Skin is warm nature. He was given Motrin and fluids for symptomatic relief and will be reevaluated and p.o. challenged. Patient has significant improvement on reevaluation, tolerating p.o. without difficulty. He is requesting discharge home and a note for work. I believe this is appropriate. Covid swab pending. Patient's repeat neurologic examination is normal.  My suspicion for serious pathology is low given a lack of significant risk factors and reassuring history and physical examination. I see nothing to suggest intracranial hemorrhage, meningitis, encephalitis, mass lesion, stroke or thrombosis. I feel the patient can be safely discharged to home with outpatient follow up. Instructions have been given for the patient to return if there is any significant worsening of the headache or the development of confusion, vision change, weakness, numbness, difficulty with speech or walking. He is agreeable to this plan and is stable for discharge at this time. FINAL IMPRESSION      1.  Acute nonintractable headache, unspecified headache type          DISPOSITION/PLAN   DISPOSITION Decision To Discharge 12/08/2021 09:37:39 PM      PATIENT REFERRED TO:  Referring Not In System    Call   For a re-check in  2-3   days    [facility] Emergency Department  [facility] [address]  [phone]  Go to   If symptoms worsen      DISCHARGE MEDICATIONS:  Discharge Medication List as of 12/8/2021  9:45 PM          DISCONTINUED MEDICATIONS:  Discharge Medication List as of 12/8/2021  9:45 PM                 (Please note that portions of this note were completed with a voice recognition program.  Efforts were made to edit the dictations but occasionally words are mis-transcribed.)    Antonio Juarez PA-C (electronically signed)           Sheila Whitehead PA-C  12/08/21 6685

## 2021-12-29 ENCOUNTER — TELEPHONE (OUTPATIENT)
Dept: SURGERY | Age: 26
End: 2021-12-29

## 2022-01-26 ENCOUNTER — NURSE TRIAGE (OUTPATIENT)
Dept: OTHER | Facility: CLINIC | Age: 27
End: 2022-01-26

## 2022-01-27 ENCOUNTER — VIRTUAL VISIT (OUTPATIENT)
Dept: PRIMARY CARE CLINIC | Age: 27
End: 2022-01-27
Payer: MEDICAID

## 2022-01-27 DIAGNOSIS — Z20.822 ENCOUNTER BY TELEHEALTH FOR SUSPECTED COVID-19: Primary | ICD-10-CM

## 2022-01-27 PROCEDURE — 99213 OFFICE O/P EST LOW 20 MIN: CPT | Performed by: STUDENT IN AN ORGANIZED HEALTH CARE EDUCATION/TRAINING PROGRAM

## 2022-01-27 SDOH — ECONOMIC STABILITY: FOOD INSECURITY: WITHIN THE PAST 12 MONTHS, THE FOOD YOU BOUGHT JUST DIDN'T LAST AND YOU DIDN'T HAVE MONEY TO GET MORE.: NEVER TRUE

## 2022-01-27 SDOH — ECONOMIC STABILITY: FOOD INSECURITY: WITHIN THE PAST 12 MONTHS, YOU WORRIED THAT YOUR FOOD WOULD RUN OUT BEFORE YOU GOT MONEY TO BUY MORE.: NEVER TRUE

## 2022-01-27 ASSESSMENT — SOCIAL DETERMINANTS OF HEALTH (SDOH): HOW HARD IS IT FOR YOU TO PAY FOR THE VERY BASICS LIKE FOOD, HOUSING, MEDICAL CARE, AND HEATING?: NOT HARD AT ALL

## 2022-01-27 ASSESSMENT — ENCOUNTER SYMPTOMS
SHORTNESS OF BREATH: 0
COUGH: 1
CHEST TIGHTNESS: 0

## 2022-01-27 ASSESSMENT — PATIENT HEALTH QUESTIONNAIRE - PHQ9
SUM OF ALL RESPONSES TO PHQ QUESTIONS 1-9: 2
SUM OF ALL RESPONSES TO PHQ QUESTIONS 1-9: 2
2. FEELING DOWN, DEPRESSED OR HOPELESS: 1
SUM OF ALL RESPONSES TO PHQ9 QUESTIONS 1 & 2: 2
1. LITTLE INTEREST OR PLEASURE IN DOING THINGS: 1
SUM OF ALL RESPONSES TO PHQ QUESTIONS 1-9: 2
SUM OF ALL RESPONSES TO PHQ QUESTIONS 1-9: 2

## 2022-01-27 NOTE — PROGRESS NOTES
Solomon Tse (:  1995) is a 31 y.o. male,New patient, here for evaluation of the following chief complaint(s): Concern For COVID-19 (c/o body ache,sob, cough, chills,no smell, loss of taste x 2 days.)          ASSESSMENT/PLAN:  1. Encounter by telehealth for suspected COVID-19  Patient symptoms are suggestive of COVID-19. We are still waiting on the results. Patient has HIV and might be at higher risk for severe disease or hospitalization. I therefore recommended that he informs me if COVID-19 results are positive so that we can consider treating with antiviral (Lew Colander). Return if symptoms worsen or fail to improve. SUBJECTIVE/OBJECTIVE:  Patient presents for same day visit with complaints of concerns for COVID-19. Reports cold chills, toss of taste, headache, body aches. He also reports congestion, dry cough. Not sure about fever, no thermometer, symptoms started about 2 days ago. No known sick contacts. He took naproxen. He went to urgent care today, was tested and awaiting results. He is vaccinated against COVID-19 but overdue for booster dose. Of note, patient is taking Biktarvy, likely for HIV. Review of Systems   Constitutional: Positive for chills. HENT: Positive for congestion. Loss of smell and taste   Respiratory: Positive for cough. Negative for chest tightness and shortness of breath. Cardiovascular: Negative for chest pain. Musculoskeletal: Positive for myalgias. Neurological: Positive for headaches. Patient-Reported Vitals 2022   Patient-Reported Weight 127lb   Patient-Reported Height 5 3        Physical Exam  Able to perform since this was a telephone encounter. Solomon Tse, was evaluated through a synchronous (real-time) audio-video encounter. The patient (or guardian if applicable) is aware that this is a billable service. Verbal consent to proceed was obtained today.  The visit was conducted pursuant to the emergency declaration under the [facility], [address] [place] [address] waiver authority and the [employer] General Act. Patient identification was verified, and a caregiver was present when appropriate. The patient was located in a state where the provider was credentialed to provide care. This dictation was generated by voice recognition computer software. Although all attempts are made to edit the dictation for accuracy, there may be errors in the transcription that are not intended. An electronic signature was used to authenticate this note.     --Ronny Land MD Psych/Behavioral

## 2022-01-27 NOTE — LETTER
[facility]  [address] [place] [address]  Phone: [phone]  Fax: [phone]    Jacqulynn Fothergill, MD        January 28, 2022     Patient: Solomon Tse   YOB: 1995   Date of Visit: 1/27/2022       To Whom It May Concern:    Solomon Tse was seen by me on 1/27/2022. If you have any questions or concerns, please don't hesitate to call.     Sincerely,        Jacqulynn Fothergill, MD

## 2022-09-25 ENCOUNTER — HOSPITAL ENCOUNTER (EMERGENCY)
Age: 27
Discharge: HOME | End: 2022-09-25
Payer: MEDICAID

## 2022-09-25 VITALS
DIASTOLIC BLOOD PRESSURE: 85 MMHG | BODY MASS INDEX: 25.64 KG/M2 | RESPIRATION RATE: 18 BRPM | HEART RATE: 98 BPM | WEIGHT: 150.2 LBS | TEMPERATURE: 98.3 F | SYSTOLIC BLOOD PRESSURE: 152 MMHG | HEIGHT: 64 IN | OXYGEN SATURATION: 99 %

## 2022-09-25 DIAGNOSIS — K08.89 TOOTH PAIN: Primary | ICD-10-CM

## 2022-09-25 PROCEDURE — 99283 EMERGENCY DEPT VISIT LOW MDM: CPT

## 2022-09-25 RX ORDER — NAPROXEN 500 MG/1
500 TABLET ORAL 2 TIMES DAILY WITH MEALS
Qty: 60 TABLET | Refills: 0 | Status: SHIPPED | OUTPATIENT
Start: 2022-09-25

## 2022-09-25 RX ORDER — AMOXICILLIN 500 MG/1
500 CAPSULE ORAL 2 TIMES DAILY
Qty: 20 CAPSULE | Refills: 0 | Status: SHIPPED | OUTPATIENT
Start: 2022-09-25 | End: 2022-10-05

## 2022-09-25 ASSESSMENT — PAIN - FUNCTIONAL ASSESSMENT: PAIN_FUNCTIONAL_ASSESSMENT: 0-10

## 2022-09-25 ASSESSMENT — ENCOUNTER SYMPTOMS
DIARRHEA: 0
SHORTNESS OF BREATH: 0
SORE THROAT: 0
NAUSEA: 0
RHINORRHEA: 0
VOMITING: 0
COUGH: 0
BACK PAIN: 0
EYE PAIN: 0
CONSTIPATION: 0
ABDOMINAL PAIN: 0

## 2022-09-25 ASSESSMENT — PAIN SCALES - GENERAL: PAINLEVEL_OUTOF10: 5

## 2022-09-25 ASSESSMENT — PAIN DESCRIPTION - LOCATION: LOCATION: MOUTH

## 2022-09-25 NOTE — Clinical Note
Suellen Pastor was seen and treated in our emergency department on 9/25/2022. He may return to work on 09/26/2022. If you have any questions or concerns, please don't hesitate to call.       MILO Cowart

## 2022-09-26 NOTE — ED PROVIDER NOTES
[facility]        Pt Name: Shayne Ordonez  MRN: [id number]  [place] 1995  Date of evaluation: 9/25/2022  Provider: MILO Meza  PCP: Referring Not In System (Inactive)  Note Started: 11:00 PM EDT       RAUDEL. I have evaluated this patient. My supervising physician was available for consultation. CHIEF COMPLAINT       Chief Complaint   Patient presents with    Abscess     Abscess started last week in mouth       HISTORY OF PRESENT ILLNESS   (Location, Timing/Onset, Context/Setting, Quality, Duration, Modifying Factors, Severity, Associated Signs and Symptoms)  Note limiting factors. Chief Complaint: Right lower tooth pain    Shayne Ordonez is a 28 y.o. male who presents to the emergency department due to right lower tooth pain has been going on since about 1 week. Patient states that he is concerned for possible infection. Patient denies any dental issues in this area in the past.  Patient states that he tried to set up with dental appointment but there are no dental clinics that have availability anytime soon. Patient is concerned for possible infection. Patient denies any fevers or chills. Patient denies any difficulty with chewing or swallowing. Patient has any neck pain or neck stiffness. Patient states he has been taking naproxen for his pain which is somewhat helpful. Nursing Notes were all reviewed and agreed with or any disagreements were addressed in the HPI. REVIEW OF SYSTEMS    (2-9 systems for level 4, 10 or more for level 5)     Review of Systems   Constitutional:  Negative for chills, diaphoresis and fever. HENT:  Positive for dental problem. Negative for congestion, rhinorrhea and sore throat. Eyes:  Negative for pain and visual disturbance. Respiratory:  Negative for cough and shortness of breath. Cardiovascular:  Negative for chest pain and leg swelling.    Gastrointestinal: tablet, R-0Normal      Cholecalciferol (VITAMIN D) 50 MCG (2000 UT) CAPS capsule Take by mouthHistorical Med      Bictegravir-Emtricitab-Tenofov (BIKTARVY PO) Take by mouth daily Historical Med               ALLERGIES     Clotrimazole, Lactase, and Singulair [montelukast sodium]    FAMILYHISTORY     History reviewed. No pertinent family history. SOCIAL HISTORY       Social History     Tobacco Use    Smoking status: Every Day     Packs/day: 0.50     Types: Cigarettes    Smokeless tobacco: Never   Vaping Use    Vaping Use: Some days   Substance Use Topics    Alcohol use: Yes     Comment: once weekly    Drug use: Yes     Types: Marijuana (Weed), Methamphetamines (Crystal Meth)     Comment: ice       SCREENINGS    Sherita Coma Scale  Eye Opening: Spontaneous  Best Verbal Response: Oriented  Best Motor Response: Obeys commands  Sherita Coma Scale Score: 15        PHYSICAL EXAM    (up to 7 for level 4, 8 or more for level 5)     ED Triage Vitals [09/25/22 2204]   BP Temp Temp Source Heart Rate Resp SpO2 Height Weight   (!) 152/85 98.3 °F (36.8 °C) Oral (!) 108 18 99 % 5' 4\" (1.626 m) 150 lb 3.2 oz (68.1 kg)       Physical Exam  Vitals and nursing note reviewed. Constitutional:       General: He is not in acute distress. Appearance: He is normal weight. He is not ill-appearing. HENT:      Head: Normocephalic and atraumatic. Mouth/Throat:      Mouth: Mucous membranes are moist.      Dentition: Normal dentition. Does not have dentures. No dental caries, dental abscesses or gum lesions. Pharynx: Oropharynx is clear. No oropharyngeal exudate or posterior oropharyngeal erythema. Comments: Right lower premolar with gumline swelling and tenderness to palpation. There is no sublingual air swelling or tenderness. The is posterior pharynx is nonerythematous and there is no edema. Patient is breathing normally without any acute distress.   Eyes:      Extraocular Movements: Extraocular movements intact. Conjunctiva/sclera: Conjunctivae normal.      Pupils: Pupils are equal, round, and reactive to light. Cardiovascular:      Rate and Rhythm: Normal rate and regular rhythm. Pulses: Normal pulses. Heart sounds: Normal heart sounds. No murmur heard. Pulmonary:      Effort: Pulmonary effort is normal. No respiratory distress. Breath sounds: Normal breath sounds. No stridor. No wheezing. Abdominal:      General: Bowel sounds are normal. There is no distension. Palpations: There is no mass. Tenderness: There is no abdominal tenderness. Musculoskeletal:      Cervical back: Normal range of motion and neck supple. Right lower leg: No edema. Left lower leg: No edema. Neurological:      Mental Status: He is alert and oriented to person, place, and time. Psychiatric:         Mood and Affect: Mood normal.         Behavior: Behavior normal.       DIAGNOSTIC RESULTS   LABS:    Labs Reviewed - No data to display    When ordered only abnormal lab results are displayed. All other labs were within normal range or not returned as of this dictation. EKG: When ordered, EKG's are interpreted by the Emergency Department Physician in the absence of a cardiologist.  Please see their note for interpretation of EKG. RADIOLOGY:   Non-plain film images such as CT, Ultrasound and MRI are read by the radiologist. Plain radiographic images are visualized and preliminarily interpreted by the ED Provider with the below findings:        Interpretation per the Radiologist below, if available at the time of this note:    No orders to display     No results found.         PROCEDURES   Unless otherwise noted below, none     Procedures    CRITICAL CARE TIME       CONSULTS:  None      EMERGENCY DEPARTMENT COURSE and DIFFERENTIAL DIAGNOSIS/MDM:   Vitals:    Vitals:    09/25/22 2204 09/25/22 2300   BP: (!) 152/85    Pulse: (!) 108 98   Resp: 18    Temp: 98.3 °F (36.8 °C)    TempSrc: Oral    SpO2: (Please note that portions of this note were completed with a voice recognition program.  Efforts were made to edit the dictations but occasionally words are mis-transcribed.)    MILO Cowart (electronically signed)            Carin Cowart  09/25/22 3289

## 2022-09-26 NOTE — ED NOTES
Discharge and education instructions reviewed. Patient verbalized understanding, teach-back successful. Patient denied questions at this time. No acute distress noted. Patient instructed to follow-up as noted - return to emergency department if symptoms worsen. Patient verbalized understanding. Discharged per EDMD with discharged instructions.      Erick Valles RN  09/25/22 3183

## 2022-09-26 NOTE — DISCHARGE INSTRUCTIONS
Toothache    Toothaches are generally caused by tooth decay. If you have severe pain or swelling around a tooth, you may have a deep tooth infection. Tooth decay and infections require evaluation and treatment by a dentist or an oral surgeon. The emergency department will provide you with the best possible care available for your dental problem. Unfortunately, there is not a dentist or dental clinic available in the department. Routine dental care, such as fillings, tooth extractions, or isa canals are not available in the emergency department. However, we are avaialbe for emergencies including abscesses, fractures of the jaw and other oral trauma such as a tooth that is knocked out. Any other dental problem is best treated by a dentist.  Please see the list of dental clinics in the area if you do not currently have a dentist.      Treatment That Can Be Provided for Toothache in the Emergency Department    If you have a severe toothache, medication may be prescribed for you until you are able to see a dentist.  If you are given an antibiotic, take it as prescribed and continue to take it until gone. Even if you start to feel better, your toothache will need to be treated by a dentist or an oral surgeon. Pain medication may be prescribed for you. As is the policy of the [facility] Department, the emergency department uses nonsteroidal anti-inflammatory medication (NSAIDs) as the primary medication for pain. These may include ibuprofen (Motrin®) or naproxyn sodium (Naprosyn®). Patients who are unable to take nonsteroidal anti-inflammatory medications will generally be advised to take acetaminophen (Tylenol®) for dental pain. As is the policy of the [address] [place] [address], the hospitals emergency department policy strongly discourages the use of the narcotic medications. (Tylenol #3®, Vicodin®, etc) are restricted by specific, strict guidelines.     If you have any

## 2022-11-18 ENCOUNTER — APPOINTMENT (OUTPATIENT)
Dept: GENERAL RADIOLOGY | Age: 27
End: 2022-11-18
Payer: MEDICAID

## 2022-11-18 ENCOUNTER — HOSPITAL ENCOUNTER (EMERGENCY)
Age: 27
Discharge: HOME | End: 2022-11-18
Payer: MEDICAID

## 2022-11-18 VITALS
HEIGHT: 64 IN | OXYGEN SATURATION: 98 % | WEIGHT: 145 LBS | BODY MASS INDEX: 24.75 KG/M2 | DIASTOLIC BLOOD PRESSURE: 71 MMHG | HEART RATE: 98 BPM | RESPIRATION RATE: 16 BRPM | SYSTOLIC BLOOD PRESSURE: 122 MMHG | TEMPERATURE: 97.9 F

## 2022-11-18 DIAGNOSIS — M79.674 PAIN IN RIGHT TOE(S): Primary | ICD-10-CM

## 2022-11-18 PROCEDURE — 6370000000 HC RX 637 (ALT 250 FOR IP): Performed by: PHYSICIAN ASSISTANT

## 2022-11-18 PROCEDURE — 73660 X-RAY EXAM OF TOE(S): CPT

## 2022-11-18 PROCEDURE — 99283 EMERGENCY DEPT VISIT LOW MDM: CPT

## 2022-11-18 RX ORDER — IBUPROFEN 800 MG/1
800 TABLET ORAL ONCE
Status: COMPLETED | OUTPATIENT
Start: 2022-11-18 | End: 2022-11-18

## 2022-11-18 RX ORDER — NAPROXEN 500 MG/1
500 TABLET ORAL 2 TIMES DAILY
Qty: 20 TABLET | Refills: 0 | Status: SHIPPED | OUTPATIENT
Start: 2022-11-18 | End: 2022-11-28

## 2022-11-18 RX ADMIN — IBUPROFEN 800 MG: 800 TABLET, FILM COATED ORAL at 22:48

## 2022-11-18 ASSESSMENT — PAIN DESCRIPTION - ORIENTATION: ORIENTATION: RIGHT

## 2022-11-18 ASSESSMENT — PAIN SCALES - GENERAL
PAINLEVEL_OUTOF10: 10
PAINLEVEL_OUTOF10: 10

## 2022-11-18 ASSESSMENT — PAIN - FUNCTIONAL ASSESSMENT: PAIN_FUNCTIONAL_ASSESSMENT: 0-10

## 2022-11-18 ASSESSMENT — ENCOUNTER SYMPTOMS
SHORTNESS OF BREATH: 0
COLOR CHANGE: 0
BACK PAIN: 0

## 2022-11-18 ASSESSMENT — PAIN DESCRIPTION - LOCATION: LOCATION: FOOT

## 2022-11-18 ASSESSMENT — PAIN DESCRIPTION - DESCRIPTORS: DESCRIPTORS: ACHING;THROBBING

## 2022-11-19 NOTE — ED PROVIDER NOTES
[facility]        Pt Name: Kaylie Fan  MRN: [id number]  [place] 1995  Date of evaluation: 11/18/2022  Provider: Eva Andersen PA-C  PCP: Referring Not In System (Inactive)  Note Started: 10:53 PM EST 11/18/2022        RAUDEL. I have evaluated this patient. My supervising physician was available for consultation. CHIEF COMPLAINT       Chief Complaint   Patient presents with    Foot Pain     10/10 right foot pain x 3 days. Limited mobility, denies injury. HISTORY OF PRESENT ILLNESS   (Location, Timing/Onset, Context/Setting, Quality, Duration, Modifying Factors, Severity, Associated Signs and Symptoms)  Note limiting factors. Chief Complaint: Right foot pain    Kaylie Fan is a 28 y.o. male who presents to the emergency department complaining of 3 days of pain to the right foot. He states it is primarily over the third through fifth toes of right foot. Denies any preceding injury. He does stand for long periods of time at work. He wears work boots with Dr. Teri Davis insoles. He does not recall any specific injury to this area. He has increased pain to palpation and when he bears weight. He denies any skin rash or discoloration. Denies any acute nail changes. He does have history of HIV but claims compliance with his HIV medications. He claims that he is compliant with seeing his infectious disease specialist.  Denies fever or chills or any acute respiratory complaints. Denies chest pain or shortness of breath. Nursing Notes were all reviewed and agreed with or any disagreements were addressed in the HPI. REVIEW OF SYSTEMS    (2-9 systems for level 4, 10 or more for level 5)     Review of Systems   Constitutional:  Negative for chills and fever. HENT: Negative. Eyes:  Negative for visual disturbance. Respiratory:  Negative for shortness of breath.     Cardiovascular:  Negative for chest pain, palpitations and leg swelling. Musculoskeletal:  Positive for myalgias. Negative for back pain, joint swelling, neck pain and neck stiffness. Skin:  Negative for color change, pallor, rash and wound. Neurological:  Negative for dizziness, tremors, seizures, syncope, facial asymmetry, speech difficulty, weakness, light-headedness, numbness and headaches. Psychiatric/Behavioral:  Negative for confusion. All other systems reviewed and are negative. Positives and Pertinent negatives as per HPI. Except as noted above in the ROS, all other systems were reviewed and negative. PAST MEDICAL HISTORY     Past Medical History:   Diagnosis Date    Asthma     History of seizures     as a child    HIV (human immunodeficiency virus infection) ([facility] [address]     Immune deficiency disorder ([facility] [address]          SURGICAL HISTORY     Past Surgical History:   Procedure Laterality Date    COLONOSCOPY      ENDOSCOPY, COLON, DIAGNOSTIC      RECTAL SURGERY N/A 12/31/2020    EXAM UNDER ANESTHESIA, PERIRECTAL ABSCESS DRAINAGE Collection of gonorrhea and chlamydia  culture and rectal biopsy performed by Brandon Kraus MD at [place] [address] N/A 9/28/2020    [facility] performed by Arlyn Salinas MD at [address] [place] [address] N/A 9/30/2020    SIGMOIDOSCOPY DIAGNOSTIC FLEXIBLE performed by Arlyn Salinas MD at [facility] 9/30/2020    [facility] DIAGNOSTIC ONLY [facility]       Previous Medications    BICTEGRAVIR-EMTRICITAB-TENOFOV (BIKTARVY PO)    Take by mouth daily          ALLERGIES     Clotrimazole, Lactase, and Singulair [montelukast sodium]    FAMILYHISTORY     History reviewed. No pertinent family history.        SOCIAL HISTORY       Social History     Tobacco Use    Smoking status: Every Day     Packs/day: 0.50     Types: Cigarettes    Smokeless tobacco: Never   Vaping Use    Vaping Use: Some days   Substance Use Topics    Alcohol use: Yes     Comment: once weekly    Drug use: Yes     Types: Marijuana (Weed), Methamphetamines (Crystal Meth)     Comment: ice       SCREENINGS             PHYSICAL EXAM    (up to 7 for level 4, 8 or more for level 5)     ED Triage Vitals [11/18/22 2221]   BP Temp Temp Source Heart Rate Resp SpO2 Height Weight   122/71 97.9 °F (36.6 °C) Oral 98 16 98 % 5' 4\" (1.626 m) 145 lb (65.8 kg)       Physical Exam  Vitals and nursing note reviewed. Constitutional:       Appearance: Normal appearance. He is well-developed. He is not toxic-appearing or diaphoretic. HENT:      Head: Normocephalic and atraumatic. Right Ear: External ear normal.      Left Ear: External ear normal.      Nose: Nose normal.   Eyes:      General:         Right eye: No discharge. Left eye: No discharge. Cardiovascular:      Rate and Rhythm: Normal rate. Pulses:           Dorsalis pedis pulses are 2+ on the right side and 2+ on the left side. Posterior tibial pulses are 2+ on the right side and 2+ on the left side. Pulmonary:      Effort: Pulmonary effort is normal.      Breath sounds: Normal breath sounds. Musculoskeletal:         General: Normal range of motion. Cervical back: Normal range of motion. Right hip: Normal.      Left hip: Normal.      Right upper leg: Normal.      Left upper leg: Normal.      Right knee: Normal.      Left knee: Normal.      Right lower leg: Normal.      Left lower leg: Normal.      Right ankle: Normal.      Right Achilles Tendon: Normal.      Left ankle: Normal.      Left Achilles Tendon: Normal.      Right foot: Normal range of motion and normal capillary refill. Tenderness (third through fifth digits) present. No swelling, deformity, bunion, Charcot foot, foot drop, prominent metatarsal heads, laceration, bony tenderness or crepitus. Normal pulse.       Left foot: Normal.      Comments: No extremity edema, posterior calf or thigh tenderness, palpable cord, discoloration. Negative homans. No mottling, paresthesia, pain in a portion of physical findings, pain with passive range of motion, erythema, red streaking, soft tissue swelling, rash. No puncture wound or plantar wart noted. Skin:     General: Skin is warm and dry. Capillary Refill: Capillary refill takes less than 2 seconds. Coloration: Skin is not jaundiced or pale. Findings: No bruising, erythema, lesion or rash. Neurological:      Mental Status: He is alert and oriented to person, place, and time. Sensory: No sensory deficit. Motor: No weakness. Deep Tendon Reflexes: Reflexes normal.   Psychiatric:         Behavior: Behavior normal.       DIAGNOSTIC RESULTS   LABS:    Labs Reviewed - No data to display    When ordered only abnormal lab results are displayed. All other labs were within normal range or not returned as of this dictation. EKG: When ordered, EKG's are interpreted by the Emergency Department Physician in the absence of a cardiologist.  Please see their note for interpretation of EKG. RADIOLOGY:   Non-plain film images such as CT, Ultrasound and MRI are read by the radiologist. Plain radiographic images are visualized and preliminarily interpreted by the ED Provider with the below findings:        Interpretation per the Radiologist below, if available at the time of this note:    XR TOE RIGHT (MIN 2 VIEWS)   Preliminary Result   No radiographic abnormality identified.                  PROCEDURES   Unless otherwise noted below, none     Procedures    CRITICAL CARE TIME   N/a    CONSULTS:  None      EMERGENCY DEPARTMENT COURSE and DIFFERENTIAL DIAGNOSIS/MDM:   Vitals:    Vitals:    11/18/22 2221   BP: 122/71   Pulse: 98   Resp: 16   Temp: 97.9 °F (36.6 °C)   TempSrc: Oral   SpO2: 98%   Weight: 145 lb (65.8 kg)   Height: 5' 4\" (1.626 m)       Patient was given the following medications:  Medications   ibuprofen (ADVIL;MOTRIN) tablet 800 mg (800 mg Oral Given 11/18/22 0408)         Is this patient to be included in the SEP-1 Core Measure due to severe sepsis or septic shock? No   Exclusion criteria - the patient is NOT to be included for SEP-1 Core Measure due to: Infection is not suspected    This patient presents to the emergency department with right foot pain. He is tender over the third through fifth toes. No evidence of rash or acute nail injury. Motor and sensory function are preserved. Capillary fill less than 2 seconds. X-ray shows no acute osseous abnormality. No rash to suggest athlete's foot, shingles , infection or gout. My suspicion is low for subungual hematoma, paronychia, eponychia, felon, flexor tenosynovitis, PE, overt foreign body, tendon rupture, compartment syndrome, acute fracture, dislocation, DVT, arterial compromise or occlusion, limb ischemia, gout, septic joint, abscess, cellulitis, osteomyelitis, gonococcal arthritis, SCFE, avascular necrosis, Osgood-Schlatter syndrome, or other concerning pathology. crutches and NSAIDs provided. Follow up with PCP for recheck and may return to ED Per discharge instructions. We have addressed concerns and expectations. FINAL IMPRESSION      1.  Pain in right toe(s)          DISPOSITION/PLAN   DISPOSITION Decision To Discharge 11/18/2022 11:33:42 PM      PATIENT REFERRED TO:  follow up with your PCP in 1-3 days          [facility] Emergency Department  [address] [place] [address]  [phone]    If symptoms worsen    DISCHARGE MEDICATIONS:  New Prescriptions    NAPROXEN (NAPROSYN) 500 MG TABLET    Take 1 tablet by mouth 2 times daily for 20 doses       DISCONTINUED MEDICATIONS:  Discontinued Medications    CETIRIZINE (ZYRTEC) 10 MG TABLET    Take 10 mg by mouth daily    CHOLECALCIFEROL (VITAMIN D) 50 MCG (2000 UT) CAPS CAPSULE    Take by mouth    ERGOCALCIFEROL (ERGOCALCIFEROL) 1.25 MG (22447 UT) CAPSULE    Take 50,000 Units by mouth once a week IBUPROFEN (ADVIL;MOTRIN) 800 MG TABLET    Take 1 tablet by mouth every 8 hours as needed for Pain or Fever    NAPROXEN (NAPROSYN) 500 MG TABLET    Take 1 tablet by mouth 2 times daily (with meals)    ONDANSETRON (ZOFRAN-ODT) 4 MG DISINTEGRATING TABLET    Place 1 tablet under the tongue 3 times daily as needed for Nausea or Vomiting              (Please note that portions of this note were completed with a voice recognition program.  Efforts were made to edit the dictations but occasionally words are mis-transcribed.)    Murry Soulier, PA-C (electronically signed)            Murry Soulier, PA-C  11/18/22 76 [facility] [place]  11/18/22 9147

## 2023-11-01 NOTE — ED NOTES
Discharge instructions given, patient acknowledged understanding, rx x1 sent to pharmacy, patient ambulated out of ed upon discharge      Carolyn Barrera RN  05/20/21 2675
Is This A New Presentation, Or A Follow-Up?: Skin Lesion
How Severe Is Your Skin Lesion?: mild
Has Your Skin Lesion Been Treated?: not been treated

## (undated) DEVICE — PENCIL ES ULT VAC W TELSCP NOSE EZ CLN BLDE 10FT

## (undated) DEVICE — PROCEDURE KIT ENDOSCP CUST

## (undated) DEVICE — SURE SET-DOUBLE BASIN-LF: Brand: MEDLINE INDUSTRIES, INC.

## (undated) DEVICE — JEWISH HOSPITAL TURNOVER KIT: Brand: MEDLINE INDUSTRIES, INC.

## (undated) DEVICE — GARMENT,MEDLINE,DVT,INT,CALF,MED, GEN2: Brand: MEDLINE

## (undated) DEVICE — ST FLUFF LG 1 PLY: Brand: DEROYAL

## (undated) DEVICE — PAD,ABDOMINAL,5"X9",ST,LF,25/BX: Brand: MEDLINE INDUSTRIES, INC.

## (undated) DEVICE — MOUTHPIECE ENDOSCP L CTRL OPN AND SIDE PORTS DISP

## (undated) DEVICE — GOWN AURORA NONREINF LG: Brand: MEDLINE INDUSTRIES, INC.

## (undated) DEVICE — BW-412T DISP COMBO CLEANING BRUSH: Brand: SINGLE USE COMBINATION CLEANING BRUSH

## (undated) DEVICE — SET VLV 3 PC AWS DISPOSABLE GRDIAN SCOPEVALET

## (undated) DEVICE — PAD,NON-ADHERENT,3X8,STERILE,LF,1/PK: Brand: MEDLINE

## (undated) DEVICE — TRAY PREP DRY W/ PREM GLV 2 APPL 6 SPNG 2 UNDPD 1 OVERWRAP

## (undated) DEVICE — STANDARD HYPODERMIC NEEDLE,POLYPROPYLENE HUB: Brand: MONOJECT

## (undated) DEVICE — PLATE ES AD W 9FT CRD 2

## (undated) DEVICE — SOLUTION IV IRRIG WATER 500ML POUR BRL ST 2F7113

## (undated) DEVICE — COVER LT HNDL BLU PLAS

## (undated) DEVICE — GLOVE SURG SZ 7 CRM LTX FREE POLYISOPRENE POLYMER BEAD ANTI

## (undated) DEVICE — UNDERPANTS INCONT XL 45-70IN KNIT SEAMLESS DSGN COLOR-CODED

## (undated) DEVICE — FORCEPS BX L240CM WRK CHN 2.8MM STD CAP W/ NDL MIC MESH

## (undated) DEVICE — SURGICAL SET UP - SURE SET: Brand: MEDLINE INDUSTRIES, INC.

## (undated) DEVICE — SHEET,T,THYROID,STERILE: Brand: MEDLINE